# Patient Record
Sex: FEMALE | Race: WHITE | NOT HISPANIC OR LATINO | Employment: STUDENT | URBAN - METROPOLITAN AREA
[De-identification: names, ages, dates, MRNs, and addresses within clinical notes are randomized per-mention and may not be internally consistent; named-entity substitution may affect disease eponyms.]

---
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
## 2021-03-10 ENCOUNTER — OFFICE VISIT (OUTPATIENT)
Dept: INTERNAL MEDICINE CLINIC | Facility: CLINIC | Age: 21
End: 2021-03-10
Payer: COMMERCIAL

## 2021-03-10 VITALS
WEIGHT: 139.8 LBS | TEMPERATURE: 97.8 F | OXYGEN SATURATION: 99 % | BODY MASS INDEX: 24.77 KG/M2 | DIASTOLIC BLOOD PRESSURE: 86 MMHG | SYSTOLIC BLOOD PRESSURE: 122 MMHG | HEART RATE: 73 BPM | HEIGHT: 63 IN

## 2021-03-10 DIAGNOSIS — Z83.438 FAMILY HISTORY OF HYPERLIPIDEMIA: ICD-10-CM

## 2021-03-10 DIAGNOSIS — Z00.01 ENCOUNTER FOR GENERAL ADULT MEDICAL EXAMINATION WITH ABNORMAL FINDINGS: Primary | ICD-10-CM

## 2021-03-10 DIAGNOSIS — M54.6 ACUTE BILATERAL THORACIC BACK PAIN: ICD-10-CM

## 2021-03-10 DIAGNOSIS — N92.0 MENORRHAGIA WITH REGULAR CYCLE: ICD-10-CM

## 2021-03-10 PROBLEM — M54.50 ACUTE BILATERAL LOW BACK PAIN WITHOUT SCIATICA: Status: ACTIVE | Noted: 2021-03-10

## 2021-03-10 PROBLEM — J30.2 SEASONAL ALLERGIES: Status: ACTIVE | Noted: 2021-03-10

## 2021-03-10 PROCEDURE — 3725F SCREEN DEPRESSION PERFORMED: CPT | Performed by: INTERNAL MEDICINE

## 2021-03-10 PROCEDURE — 99385 PREV VISIT NEW AGE 18-39: CPT | Performed by: INTERNAL MEDICINE

## 2021-03-10 RX ORDER — LORATADINE 10 MG/1
10 TABLET ORAL DAILY
COMMUNITY

## 2021-03-10 NOTE — PROGRESS NOTES
[facility] INTERNAL MEDICINE [place]    NAME: Samuel Westfall  AGE: 24 y o  SEX: female  : 2000     DATE: 3/10/2021     Assessment and Plan:     Problem List Items Addressed This Visit        Other    Menorrhagia with regular cycle     She has heavy periods specially in the 1st and 2 days  She is taking naproxen with  Improvement on dysmenorrhea as well  Relevant Orders    CBC    TSH, 3rd generation with Free T4 reflex    Comprehensive metabolic panel    Acute bilateral thoracic back pain      Pain is likely muscleskeletal   Recommended naproxen as needed and back exercises  Patient would like to see orthopedic surgeon for the same and referral was made  Relevant Orders    C-reactive protein    Ambulatory referral to Orthopedic Surgery      Other Visit Diagnoses     Encounter for general adult medical examination with abnormal findings    -  Primary    Family history of hyperlipidemia        Relevant Orders    Lipid Panel with Direct LDL reflex          Immunizations and preventive care screenings were discussed with patient today  Appropriate education was printed on patient's after visit summary  Counseling:  Alcohol/drug use: discussed moderation in alcohol intake, the recommendations for healthy alcohol use, and avoidance of illicit drug use  Dental Health: discussed importance of regular tooth brushing, flossing, and dental visits  Injury prevention: discussed safety/seat belts, safety helmets, smoke detectors, carbon dioxide detectors, and smoking near bedding or upholstery  Sexual health: discussed sexually transmitted diseases, partner selection, use of condoms, avoidance of unintended pregnancy, and contraceptive alternatives  · Exercise: the importance of regular exercise/physical activity was discussed  Recommend exercise 3-5 times per week for at least 30 minutes  Return in 1 year (on 3/10/2022)  Chief Complaint:     Chief Complaint   Patient presents with    Establish Care     referral to see orthopedics      History of Present Illness:     Adult Annual Physical   Patient here for a comprehensive physical exam  The patient reports problems - back pain, moderate to severe localized in the lower back and neck that is getting worse for the past 2 weeks  Patient so far tried Tylenol and hot/ cold compresses with minimal relief  She did have a history of lower back pain in the past, but now the pain is more localized in the midthoracic area and neck  There is no  Radiating pain, no numbness, tingling or weakness on upper lower extremities  Patient denies any family history of autoimmune disease, she does have a family history of hyperlipidemia and hypertension only  Patient also has a history of dysmenorrhea and menorrhagia, she was seen by OBGYN recently, she was prescribed naproxen to be used as needed, with moderate relief of both  Patient would like to have a blood work done as a checkup, she states that has been many years since she had a blood work done  Patient refused flu vaccine  Diet and Physical Activity  · Diet/Nutrition: well balanced diet, limited junk food, consuming 3-5 servings of fruits/vegetables daily and adequate whole grain intake  · Exercise: walking and less than 30 minutes on average  Depression Screening  PHQ-9 Depression Screening    PHQ-9:   Frequency of the following problems over the past two weeks:      Little interest or pleasure in doing things: 0 - not at all  Feeling down, depressed, or hopeless: 0 - not at all  PHQ-2 Score: 0       General Health  · Sleep: sleeps well and gets more than 8 hours of sleep on average  · Hearing: normal - bilateral   · Vision: wears glasses  · Dental: regular dental visits  /GYN Health  · Last menstrual period: 02/28/2021  · Contraceptive method: not sexualy active    · History of STDs?: no      Review of Systems: Review of Systems   Constitutional: Negative for appetite change, fatigue and fever  HENT: Negative for congestion, ear discharge, ear pain, postnasal drip and rhinorrhea  Eyes: Negative for visual disturbance  Respiratory: Negative for cough, chest tightness, shortness of breath and wheezing  Cardiovascular: Negative for chest pain, palpitations and leg swelling  Gastrointestinal: Negative for abdominal pain, nausea and vomiting  Genitourinary:          Painful and heavy menstrual bleeding   Musculoskeletal: Positive for back pain (  Bilateral thoracic and occasionally lower back pain) and neck pain ( occasionally)  Negative for arthralgias and joint swelling  Skin: Negative for rash  Neurological: Negative for dizziness, weakness, numbness and headaches  Psychiatric/Behavioral: Negative for confusion  The patient is not nervous/anxious  Past Medical History:     Past Medical History:   Diagnosis Date    Allergic       Past Surgical History:     History reviewed  No pertinent surgical history     Social History:     E-Cigarette/Vaping    E-Cigarette Use Never User      E-Cigarette/Vaping Substances    Nicotine No     THC No     CBD No     Flavoring No     Other No     Unknown No      Social History     Socioeconomic History    Marital status: Single     Spouse name: None    Number of children: None    Years of education: None    Highest education level: None   Occupational History    None   Social Needs    Financial resource strain: None    Food insecurity     Worry: None     Inability: None    Transportation needs     Medical: None     Non-medical: None   Tobacco Use    Smoking status: Never Smoker    Smokeless tobacco: Never Used   Substance and Sexual Activity    Alcohol use: Not Currently    Drug use: Not Currently    Sexual activity: Yes     Partners: Male     Birth control/protection: Condom Male   Lifestyle    Physical activity     Days per week: None Minutes per session: None    Stress: None   Relationships    Social connections     Talks on phone: None     Gets together: None     Attends Christianity service: None     Active member of club or organization: None     Attends meetings of clubs or organizations: None     Relationship status: None    Intimate partner violence     Fear of current or ex partner: None     Emotionally abused: None     Physically abused: None     Forced sexual activity: None   Other Topics Concern    None   Social History Narrative    None      Family History:     Family History   Problem Relation Age of Onset    Hypertension Father     Hyperlipidemia Father     Hypertension Paternal Grandmother       Current Medications:     Current Outpatient Medications   Medication Sig Dispense Refill    loratadine (CLARITIN) 10 mg tablet Take 10 mg by mouth daily      naproxen sodium (ANAPROX) 550 mg tablet Take 1 tablet (550 mg total) by mouth 2 (two) times a day with meals for 30 doses As needed for cramps 30 tablet 3     No current facility-administered medications for this visit  Allergies:     No Known Allergies   Physical Exam:     /86 (BP Location: Left arm, Patient Position: Sitting, Cuff Size: Standard)   Pulse 73   Temp 97 8 °F (36 6 °C) (Tympanic)   Ht 5' 3" (1 6 m)   Wt 63 4 kg (139 lb 12 8 oz)   SpO2 99%   BMI 24 76 kg/m²     Physical Exam  Vitals signs and nursing note reviewed  Constitutional:       General: She is not in acute distress  Appearance: She is well-developed  HENT:      Head: Normocephalic and atraumatic  Right Ear: Tympanic membrane, ear canal and external ear normal       Left Ear: Tympanic membrane, ear canal and external ear normal       Nose: Nose normal  No congestion  Mouth/Throat:      Mouth: Mucous membranes are moist       Pharynx: Oropharynx is clear  No oropharyngeal exudate     Eyes:      Conjunctiva/sclera: Conjunctivae normal    Neck:      Musculoskeletal: Normal range of motion and neck supple  No muscular tenderness  Cardiovascular:      Rate and Rhythm: Normal rate and regular rhythm  Heart sounds: No murmur  Pulmonary:      Effort: Pulmonary effort is normal  No respiratory distress  Breath sounds: Normal breath sounds  Abdominal:      General: Bowel sounds are normal       Palpations: Abdomen is soft  Tenderness: There is no abdominal tenderness  Musculoskeletal: Normal range of motion  General: No swelling or tenderness  Comments:  No paraspinal muscle tenderness,  Straight leg raise negative   Skin:     General: Skin is warm and dry  Capillary Refill: Capillary refill takes less than 2 seconds  Findings: No rash  Neurological:      Mental Status: She is alert and oriented to person, place, and time  Sensory: No sensory deficit  Motor: No weakness  Gait: Gait normal    Psychiatric:         Mood and Affect: Mood normal          Thought Content:  Thought content normal          Judgment: Judgment normal           Amanda Dugan MD   [facility] INTERNAL MEDICINE [place]

## 2021-03-10 NOTE — ASSESSMENT & PLAN NOTE
Pain is likely muscleskeletal   Recommended naproxen as needed and back exercises  Patient would like to see orthopedic surgeon for the same and referral was made

## 2021-03-10 NOTE — PATIENT INSTRUCTIONS
Upper Back Exercises   AMBULATORY CARE:   Upper back exercises  help heal and strengthen your back muscles and prevent another injury  Ask your healthcare provider if you need to see a physical therapist for more advanced exercises  · Do the exercises on a mat or firm surface  (not on a bed) to support your spine  · Move slowly and smoothly  Avoid fast or jerky motions  · Breathe normally  Do not hold your breath  · Stop if you feel pain  It is normal to feel some discomfort at first  Regular exercise will help decrease your discomfort over time  Seek care immediately if:   · You have severe pain that prevents you from moving  Contact your healthcare provider if:   · Your pain becomes worse  · You have new pain  · You have questions or concerns about your condition, care, or exercise program     Perform upper back exercises safely:  Ask your healthcare provider which of the following exercises are best for you and how often to do them  · Head rolls:  Sit in a chair or stand  Bring your chin toward your chest and roll your head to the right  Your ear should be over your shoulder  Hold this position for 5 seconds  Roll your head back toward your chest and to the left  Your ear should be over your left shoulder  Hold this position for 5 seconds  Next, roll your head back slowly in a clockwise [place] and repeat 3 times  Do 3 sets of head rolls  · Scapular squeeze:  Sit or stand with your arms at your sides  Squeeze your shoulder blades together and hold for 3 seconds  Relax and repeat 3 times  · Pectoralis stretch:   a doorway  Lift your hands and place them on each side of the door frame or wall slightly higher than your head  Lean forward slowly until you feel a gentle stretch  Hold for 15 seconds  Repeat 3 times, or as directed  · Cat and camel exercise:  Place your hands and knees on the floor   Arch your back upward toward the ceiling and lower your head  Round out your spine as much as you can  Hold for 5 seconds  Lift your head upward and push your chest downward toward the floor  Hold for 5 seconds  Do 3 sets or as directed  · Bird dog:  Place your hands and knees on the floor  Keep your wrists directly below your shoulders and your knees directly below your hips  Pull your belly button in toward your spine  Do not flatten or arch your back  Tighten your abdominal muscles  Raise one arm straight out so that it is aligned with your head  Next, raise the leg opposite your arm  Hold this position for 15 seconds  Lower your arm and leg slowly and change sides  Do 5 sets  © Copyright [address] Information is for End User's use only and may not be sold, redistributed or otherwise used for commercial purposes  All illustrations and images included in CareNotes® are the copyrighted property of A D A M , Inc  or [facility] Elias Michaud   The above information is an  only  It is not intended as medical advice for individual conditions or treatments  Talk to your doctor, nurse or pharmacist before following any medical regimen to see if it is safe and effective for you  Wellness Visit for Adults   AMBULATORY CARE:   A wellness visit  is when you see your healthcare provider to get screened for health problems  Your healthcare provider will also give you advice on how to stay healthy  Write down your questions so you remember to ask them  Ask your healthcare provider how often you should have a wellness visit  What happens at a wellness visit:  Your healthcare provider will ask about your health, and your family history of health problems  This includes high blood pressure, heart disease, and cancer  He or she will ask if you have symptoms that concern you, if you smoke, and about your mood  You may also be asked about your intake of medicines, supplements, food, and alcohol   Any of the following may be done:  · Your weight  will be checked  Your height may also be checked so your body mass index (BMI) can be calculated  Your BMI shows if you are at a healthy weight  · Your blood pressure  and heart rate will be checked  Your temperature may also be checked  · Blood and urine tests  may be done  Blood tests may be done to check your cholesterol levels  Abnormal cholesterol levels increase your risk for heart disease and stroke  You may also need a blood or urine test to check for diabetes if you are at increased risk  Urine tests may be done to look for signs of an infection or kidney disease  · A physical exam  includes checking your heartbeat and lungs with a stethoscope  Your healthcare provider may also check your skin to look for sun damage  · Screening tests  may be recommended  A screening test is done to check for diseases that may not cause symptoms  The screening tests you may need depend on your age, gender, family history, and lifestyle habits  For example, colorectal screening may be recommended if you are 48years old or older  Screening tests you need if you are a woman:   · A Pap smear  is used to screen for cervical cancer  Pap smears are usually done every 3 to 5 years depending on your age  You may need them more often if you have had abnormal Pap smear test results in the past  Ask your healthcare provider how often you should have a Pap smear  · A mammogram  is an x-ray of your breasts to screen for breast cancer  Experts recommend mammograms every 2 years starting at age 48 years  You may need a mammogram at age 52 years or younger if you have an increased risk for breast cancer  Talk to your healthcare provider about when you should start having mammograms and how often you need them  Vaccines you may need:   · Get an influenza vaccine  every year  The influenza vaccine protects you from the flu  Several types of viruses cause the flu   The viruses change over time, so new vaccines are made each year     · Get a tetanus-diphtheria (Td) booster vaccine  every 10 years  This vaccine protects you against tetanus and diphtheria  Tetanus is a severe infection that may cause painful muscle spasms and lockjaw  Diphtheria is a severe bacterial infection that causes a thick covering in the back of your mouth and throat  · Get a human papillomavirus (HPV) vaccine  if you are female and aged 23 to 32 or male 23 to 24 and never received it  This vaccine protects you from HPV infection  HPV is the most common infection spread by sexual contact  HPV may also cause vaginal, penile, and anal cancers  · Get a pneumococcal vaccine  if you are aged 72 years or older  The pneumococcal vaccine is an injection given to protect you from pneumococcal disease  Pneumococcal disease is an infection caused by pneumococcal bacteria  The infection may cause pneumonia, meningitis, or an ear infection  · Get a shingles vaccine  if you are 60 or older, even if you have had shingles before  The shingles vaccine is an injection to protect you from the varicella-zoster virus  This is the same virus that causes chickenpox  Shingles is a painful rash that develops in people who had chickenpox or have been exposed to the virus  How to eat healthy:  My Plate is a model for planning healthy meals  It shows the types and amounts of foods that should go on your plate  Fruits and vegetables make up about half of your plate, and grains and protein make up the other half  A serving of dairy is included on the side of your plate  The amount of calories and serving sizes you need depends on your age, gender, weight, and height  Examples of healthy foods are listed below:  · Eat a variety of vegetables  such as dark green, red, and orange vegetables  You can also include canned vegetables low in sodium (salt) and frozen vegetables without added butter or sauces      · Eat a variety of fresh fruits , canned fruit in 100% juice, frozen fruit, and dried fruit  · Include whole grains  At least half of the grains you eat should be whole grains  Examples include whole-wheat bread, wheat pasta, brown rice, and whole-grain cereals such as oatmeal     · Eat a variety of protein foods such as seafood (fish and shellfish), lean meat, and poultry without skin (turkey and chicken)  Examples of lean meats include pork leg, shoulder, or tenderloin, and beef round, sirloin, tenderloin, and extra lean ground beef  Other protein foods include eggs and egg substitutes, beans, peas, soy products, nuts, and seeds  · Choose low-fat dairy products such as skim or 1% milk or low-fat yogurt, cheese, and cottage cheese  · Limit unhealthy fats  such as butter, hard margarine, and shortening  Exercise:  Exercise at least 30 minutes per day on most days of the week  Some examples of exercise include walking, biking, dancing, and swimming  You can also fit in more physical activity by taking the stairs instead of the elevator or parking farther away from stores  Include muscle strengthening activities 2 days each week  Regular exercise provides many health benefits  It helps you manage your weight, and decreases your risk for type 2 diabetes, heart disease, stroke, and high blood pressure  Exercise can also help improve your mood  Ask your healthcare provider about the best exercise plan for you  General health and safety guidelines:   · Do not smoke  Nicotine and other chemicals in cigarettes and cigars can cause lung damage  Ask your healthcare provider for information if you currently smoke and need help to quit  E-cigarettes or smokeless tobacco still contain nicotine  Talk to your healthcare provider before you use these products  · Limit alcohol  A drink of alcohol is 12 ounces of beer, 5 ounces of wine, or 1½ ounces of liquor  · Lose weight, if needed  Being overweight increases your risk of certain health conditions   These include heart disease, high blood pressure, type 2 diabetes, and certain types of cancer  · Protect your skin  Do not sunbathe or use tanning beds  Use sunscreen with a SPF 15 or higher  Apply sunscreen at least 15 minutes before you go outside  Reapply sunscreen every 2 hours  Wear protective clothing, hats, and sunglasses when you are outside  · Drive safely  Always wear your seatbelt  Make sure everyone in your car wears a seatbelt  A seatbelt can save your life if you are in an accident  Do not use your cell phone when you are driving  This could distract you and cause an accident  Pull over if you need to make a call or send a text message  · Practice safe sex  Use latex condoms if are sexually active and have more than one partner  Your healthcare provider may recommend screening tests for sexually transmitted infections (STIs)  · Wear helmets, lifejackets, and protective gear  Always wear a helmet when you ride a bike or motorcycle, go skiing, or play sports that could cause a head injury  Wear protective equipment when you play sports  Wear a lifejacket when you are on a boat or doing water sports  © Copyright [address] Information is for End User's use only and may not be sold, redistributed or otherwise used for commercial purposes  All illustrations and images included in CareNotes® are the copyrighted property of [employer] , Inc  or [employer]  The above information is an  only  It is not intended as medical advice for individual conditions or treatments  Talk to your doctor, nurse or pharmacist before following any medical regimen to see if it is safe and effective for you

## 2021-03-10 NOTE — ASSESSMENT & PLAN NOTE
She has heavy periods specially in the 1st and 2 days  She is taking naproxen with  Improvement on dysmenorrhea as well

## 2021-03-11 ENCOUNTER — TRANSCRIBE ORDERS (OUTPATIENT)
Dept: LAB | Facility: CLINIC | Age: 21
End: 2021-03-11

## 2021-03-11 ENCOUNTER — APPOINTMENT (OUTPATIENT)
Dept: LAB | Facility: CLINIC | Age: 21
End: 2021-03-11
Payer: COMMERCIAL

## 2021-03-11 DIAGNOSIS — N92.0 MENORRHAGIA WITH REGULAR CYCLE: ICD-10-CM

## 2021-03-11 DIAGNOSIS — Z83.438 FAMILY HISTORY OF HYPERLIPIDEMIA: ICD-10-CM

## 2021-03-11 DIAGNOSIS — M54.6 ACUTE BILATERAL THORACIC BACK PAIN: ICD-10-CM

## 2021-03-11 LAB
ALBUMIN SERPL BCP-MCNC: 4.1 G/DL (ref 3.5–5)
ALP SERPL-CCNC: 84 U/L (ref 46–384)
ALT SERPL W P-5'-P-CCNC: 22 U/L (ref 12–78)
ANION GAP SERPL CALCULATED.3IONS-SCNC: 10 MMOL/L (ref 4–13)
AST SERPL W P-5'-P-CCNC: 14 U/L (ref 5–45)
BILIRUB SERPL-MCNC: 0.93 MG/DL (ref 0.2–1)
BUN SERPL-MCNC: 16 MG/DL (ref 5–25)
CALCIUM SERPL-MCNC: 9.4 MG/DL (ref 8.3–10.1)
CHLORIDE SERPL-SCNC: 106 MMOL/L (ref 100–108)
CHOLEST SERPL-MCNC: 146 MG/DL (ref 50–200)
CO2 SERPL-SCNC: 27 MMOL/L (ref 21–32)
CREAT SERPL-MCNC: 1.17 MG/DL (ref 0.6–1.3)
CRP SERPL QL: <3 MG/L
ERYTHROCYTE [DISTWIDTH] IN BLOOD BY AUTOMATED COUNT: 12.2 % (ref 11.6–15.1)
GFR SERPL CREATININE-BSD FRML MDRD: 68 ML/MIN/1.73SQ M
GLUCOSE P FAST SERPL-MCNC: 84 MG/DL (ref 65–99)
HCT VFR BLD AUTO: 43.2 % (ref 34.8–46.1)
HDLC SERPL-MCNC: 75 MG/DL
HGB BLD-MCNC: 14.7 G/DL (ref 11.5–15.4)
LDLC SERPL CALC-MCNC: 62 MG/DL (ref 0–100)
MCH RBC QN AUTO: 33.9 PG (ref 26.8–34.3)
MCHC RBC AUTO-ENTMCNC: 34 G/DL (ref 31.4–37.4)
MCV RBC AUTO: 100 FL (ref 82–98)
PLATELET # BLD AUTO: 228 THOUSANDS/UL (ref 149–390)
PMV BLD AUTO: 9.8 FL (ref 8.9–12.7)
POTASSIUM SERPL-SCNC: 4.1 MMOL/L (ref 3.5–5.3)
PROT SERPL-MCNC: 7.5 G/DL (ref 6.4–8.2)
RBC # BLD AUTO: 4.34 MILLION/UL (ref 3.81–5.12)
SODIUM SERPL-SCNC: 143 MMOL/L (ref 136–145)
TRIGL SERPL-MCNC: 46 MG/DL
TSH SERPL DL<=0.05 MIU/L-ACNC: 2.17 UIU/ML (ref 0.46–3.98)
WBC # BLD AUTO: 5.31 THOUSAND/UL (ref 4.31–10.16)

## 2021-03-11 PROCEDURE — 80053 COMPREHEN METABOLIC PANEL: CPT

## 2021-03-11 PROCEDURE — 85027 COMPLETE CBC AUTOMATED: CPT

## 2021-03-11 PROCEDURE — 84443 ASSAY THYROID STIM HORMONE: CPT

## 2021-03-11 PROCEDURE — 86140 C-REACTIVE PROTEIN: CPT

## 2021-03-11 PROCEDURE — 36415 COLL VENOUS BLD VENIPUNCTURE: CPT

## 2021-03-11 PROCEDURE — 80061 LIPID PANEL: CPT

## 2021-03-25 ENCOUNTER — APPOINTMENT (OUTPATIENT)
Dept: RADIOLOGY | Facility: AMBULARY SURGERY CENTER | Age: 21
End: 2021-03-25
Attending: ORTHOPAEDIC SURGERY
Payer: COMMERCIAL

## 2021-03-25 ENCOUNTER — CONSULT (OUTPATIENT)
Dept: OBGYN CLINIC | Facility: CLINIC | Age: 21
End: 2021-03-25
Payer: COMMERCIAL

## 2021-03-25 VITALS
BODY MASS INDEX: 23.92 KG/M2 | HEART RATE: 75 BPM | WEIGHT: 135 LBS | HEIGHT: 63 IN | SYSTOLIC BLOOD PRESSURE: 124 MMHG | DIASTOLIC BLOOD PRESSURE: 72 MMHG

## 2021-03-25 DIAGNOSIS — M54.6 ACUTE BILATERAL THORACIC BACK PAIN: ICD-10-CM

## 2021-03-25 DIAGNOSIS — M41.9 SCOLIOSIS OF THORACOLUMBAR SPINE, UNSPECIFIED SCOLIOSIS TYPE: Primary | ICD-10-CM

## 2021-03-25 PROCEDURE — 1036F TOBACCO NON-USER: CPT | Performed by: ORTHOPAEDIC SURGERY

## 2021-03-25 PROCEDURE — 72083 X-RAY EXAM ENTIRE SPI 4/5 VW: CPT

## 2021-03-25 PROCEDURE — 3008F BODY MASS INDEX DOCD: CPT | Performed by: ORTHOPAEDIC SURGERY

## 2021-03-25 PROCEDURE — 99243 OFF/OP CNSLTJ NEW/EST LOW 30: CPT | Performed by: ORTHOPAEDIC SURGERY

## 2021-03-25 NOTE — PROGRESS NOTES
Assessment:  1  Scoliosis of thoracolumbar spine, unspecified scoliosis type     2  Acute bilateral thoracic back pain  Ambulatory referral to Orthopedic Surgery    Ambulatory referral to Physical Therapy    CANCELED: XR spine thoracolumbar 2 vw     Patient Active Problem List   Diagnosis    Menorrhagia with regular cycle    Acute bilateral thoracic back pain    Seasonal allergies           Plan        78-year-old female with mild scoliosis of the thoracolumbar region  Patient and I reviewed her x-rays which does show mild curvature of her spine  We discussed due to these curvatures this could set her up to have increased irritation with increased activity  We discussed non operative treatment of physical therapy for stretching, strengthening, and to build endurance  She may continue activities as tolerated  I would not recommend any surgical intervention at this time  She may follow up with me as-needed basis  Subjective:     Patient ID:    Chief Jessa Yang 24 y o  female      HPI    Patient comes in today with regards to upper back pain  I am seeing her in consultation at the request of Deborah Mata MD  The patient reports that the pain is in the upper thoracic and scapular region and has been going on for about a month  The pain is rated at 10 at its worst   Patient states she feels a constant tenseness about her back  The pain is described as sharp  It is worsened with repetitive lifting  She does work at [employer] and does lift a lot for her job  Patient reports she does get relief with Naprosyn  She denies any injury to her back  She does not believe she has a history of scoliosis  She does note a history of tenseness in her back prior to this episode of pain       The following portions of the patient's history were reviewed and updated as appropriate: allergies, current medications, past family history, past social history, past surgical history and problem list         Social History     Socioeconomic History    Marital status: Single     Spouse name: Not on file    Number of children: Not on file    Years of education: Not on file    Highest education level: Not on file   Occupational History    Not on file   Social Needs    Financial resource strain: Not on file    Food insecurity     Worry: Not on file     Inability: Not on file    Transportation needs     Medical: Not on file     Non-medical: Not on file   Tobacco Use    Smoking status: Never Smoker    Smokeless tobacco: Never Used   Substance and Sexual Activity    Alcohol use: Not Currently    Drug use: Not Currently    Sexual activity: Yes     Partners: Male     Birth control/protection: Condom Male   Lifestyle    Physical activity     Days per week: Not on file     Minutes per session: Not on file    Stress: Not on file   Relationships    Social connections     Talks on phone: Not on file     Gets together: Not on file     Attends [employer] service: Not on file     Active member of club or organization: Not on file     Attends meetings of clubs or organizations: Not on file     Relationship status: Not on file    Intimate partner violence     Fear of current or ex partner: Not on file     Emotionally abused: Not on file     Physically abused: Not on file     Forced sexual activity: Not on file   Other Topics Concern    Not on file   Social History Narrative    Not on file     Past Medical History:   Diagnosis Date    Allergic      History reviewed  No pertinent surgical history  No Known Allergies  Current Outpatient Medications on File Prior to Visit   Medication Sig Dispense Refill    loratadine (CLARITIN) 10 mg tablet Take 10 mg by mouth daily      naproxen sodium (ANAPROX) 550 mg tablet Take 1 tablet (550 mg total) by mouth 2 (two) times a day with meals for 30 doses As needed for cramps 30 tablet 3     No current facility-administered medications on file prior to visit  Objective:    Review of Systems   Constitutional: Negative for chills, fever and unexpected weight change  HENT: Negative for hearing loss, nosebleeds and sore throat  Eyes: Negative for pain, redness and visual disturbance  Respiratory: Negative for cough, shortness of breath and wheezing  Cardiovascular: Negative for chest pain, palpitations and leg swelling  Gastrointestinal: Negative for abdominal pain, nausea and vomiting  Endocrine: Negative for polydipsia and polyuria  Genitourinary: Negative for dyspareunia and hematuria  Musculoskeletal: Positive for back pain and myalgias  Negative for arthralgias  Skin: Negative for rash and wound  Neurological: Negative for dizziness, numbness and headaches  Psychiatric/Behavioral: Negative for decreased concentration and suicidal ideas  The patient is not nervous/anxious  Back Exam     Tenderness   The patient is experiencing tenderness in the thoracic ( bilateral rhomboids, left upper trapezius,  Bilateral levator scapulae   Bilateral paraspinal musculature T5 through T7,  inferior scapular  margin )  Range of Motion   The patient has normal back ROM  Extension: normal   Flexion: normal   Lateral bend right: normal   Lateral bend left: normal   Rotation right: normal   Rotation left: normal     Other   Sensation: normal  Gait: normal   Erythema: no back redness  Scars: absent    Comments:  Left ribs are more pronounced   Right arm is closer to body  Right scapula is more pronounced  Left scapula is higher by 1 cm  Right scapular is more protracted   Right shoulder is lower than left   Right side crease is more pronounced   Pelvis is symmetric      levator scapulae musculature is tight and spastic bilaterally            Physical Exam  Vitals signs reviewed  Constitutional:       Appearance: She is well-developed  HENT:      Head: Normocephalic and atraumatic     Eyes:      Conjunctiva/sclera: Conjunctivae normal  Neck:      Musculoskeletal: Neck supple  Cardiovascular:      Rate and Rhythm: Normal rate  Pulses: Normal pulses  Pulmonary:      Effort: Pulmonary effort is normal    Skin:     General: Skin is warm and dry  Neurological:      Mental Status: She is alert and oriented to person, place, and time  Psychiatric:         Behavior: Behavior normal          Procedures  No Procedures performed today    I have personally reviewed pertinent films in PACS and my interpretation is Mild thoracolumbar scoliotic curvature   Scribe Attestation    I,:  Letitia Bee MD am acting as a scribe while in the presence of the attending physician :       I,:  Gloria Klein DO personally performed the services described in this documentation    as scribed in my presence :             Portions of the record may have been created with voice recognition software   Occasional wrong word or "sound a like" substitutions may have occurred due to the inherent limitations of voice recognition software   Read the chart carefully and recognize, using context, where substitutions have occurred

## 2021-04-12 ENCOUNTER — EVALUATION (OUTPATIENT)
Dept: PHYSICAL THERAPY | Facility: CLINIC | Age: 21
End: 2021-04-12
Payer: COMMERCIAL

## 2021-04-12 DIAGNOSIS — M54.6 ACUTE BILATERAL THORACIC BACK PAIN: ICD-10-CM

## 2021-04-12 PROCEDURE — 97110 THERAPEUTIC EXERCISES: CPT | Performed by: PHYSICAL THERAPIST

## 2021-04-12 PROCEDURE — 97161 PT EVAL LOW COMPLEX 20 MIN: CPT | Performed by: PHYSICAL THERAPIST

## 2021-04-12 NOTE — PROGRESS NOTES
PT Evaluation     Today's date: 2021  Patient name: Johana Gomes  : 2000  MRN: [id number]  Referring provider: Dalyin Sampson DO  Dx:   Encounter Diagnosis     ICD-10-CM    1  Acute bilateral thoracic back pain  M54 6 Ambulatory referral to Physical Therapy                  Assessment  Assessment details: Johana Gomes is a 24 y o  female who presents with pain, decreased strength, and decreased ROM  Due to these impairments, patient has difficulty performing a/iadls, recreational activities and work-related activities  Patient's clinical presentation is consistent with their referring diagnosis of acute bilateral thoracic back pain  Patient would benefit from skilled physical therapy to address their aforementioned impairments, improve their level of function and to improve their overall quality of life  Patient states she will only be able to attend PT one time per week secondary to scheduling conflicts  Impairments: abnormal or restricted ROM, impaired physical strength and pain with function  Understanding of Dx/Px/POC: good   Prognosis: good    Goals  Short term goals - to be achieved in 4 weeks:    Decrease pain 20-50%  Increase strength by 1/2 grade  Improve T/S ROM by 25%  Long term goals - to be achieved by discharge:    Lifting is improved to maximal level of function  Performance in related work activities is improved to maximal level of function  IADL performance in related activities is improved to maximal level of function  Sitting tolerance is improved to maximal level of function          Plan  Planned therapy interventions: manual therapy, neuromuscular re-education, patient education, strengthening, stretching, therapeutic activities, therapeutic exercise and home exercise program  Frequency: 1x week  Duration in visits: 6  Duration in weeks: 6  Plan of Care beginning date: 2021  Plan of Care expiration date: 2021  Treatment plan discussed with: patient        Subjective Evaluation    History of Present Illness  Mechanism of injury: Patient refers to PT with c/o pain in her T/S which has been present for approximately three years of insidious onset  Patient states pain has increased in the past five months of insidious onset  Patient received X-rays of her spine on 3/25/21 which revealed mild thoracolumbar scoliosis; no acute fracture or dislocation  Patient denies numbness and tingling in bilateral UE's and bilateral LE's  Patient states increased pain with lifting activities  Patient states increased pain after sitting for an hour or greater; states pain is worse in the morning  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  Pain  Current pain ratin  At best pain ratin  At worst pain rating: 10  Quality: sharp  Relieving factors: medications  Aggravating factors: lifting and sitting (Sitting for greater than 1 hour)  Progression: no change    Patient Goals  Patient goals for therapy: decreased pain          Objective     Static Posture     Shoulders  Rounded      Neurological Testing     Sensation   Cervical/Thoracic   Left   Intact: light touch    Right   Intact: light touch    Lumbar   Left   Intact: light touch    Right   Intact: light touch    Passive Range of Motion   Cervical/Thoracic Spine     Thoracic     Flexion (degrees):  WFL  Extension (degrees):  Restriction level: minimal  Left lateral flexion (degrees):  Restriction level: minimal  Right lateral flexion (degrees):  Restriction level: minimal  Left rotation (degress):  Restriction level: minimal  Right rotation (degrees):  Restriction level: minimal    Strength/Myotome Testing     Left Shoulder     Planes of Motion   Flexion: 4+ (pain)   Abduction: 5     Right Shoulder     Planes of Motion   Flexion: 4+ (pain)   Abduction: 5     Left Elbow   Flexion: 5  Extension: 5    Right Elbow   Flexion: 5  Extension: 5    Left Wrist/Hand   Wrist extension: 5  Wrist flexion: 5    Right Wrist/Hand   Wrist extension: 5  Wrist flexion: 5    Left Hip   Planes of Motion   Flexion: 5    Right Hip   Planes of Motion   Flexion: 5    Left Knee   Flexion: 5  Extension: 4+ (pain)    Right Knee   Flexion: 5  Extension: 5    Left Ankle/Foot   Dorsiflexion: 5  Plantar flexion: 5    Right Ankle/Foot   Dorsiflexion: 5  Plantar flexion: 5    General Comments:      Cervical/Thoracic Comments  Decreased mobility t/o mid T/S   Slight decrease in pain level after grade V mobilization  Repeated T/S extension in sitting with half roll - No change               Precautions: None      Manuals 4/12            Gr V mid T/S mobilization KK                                                   Neuro Re-Ed             Iso scap ret  HEP                                                                                          Ther Ex             UBE-backward             TB rows HEP            TB leanne  shld ER w/ scap retract             TB lat pull downs             Doorway stretch             Sitting T/S ext   With towel roll HEP                                      Ther Activity                                       Gait Training                                       Modalities                                           HEP access code: [email or web address]

## 2021-04-20 ENCOUNTER — OFFICE VISIT (OUTPATIENT)
Dept: PHYSICAL THERAPY | Facility: CLINIC | Age: 21
End: 2021-04-20
Payer: COMMERCIAL

## 2021-04-20 DIAGNOSIS — M54.6 ACUTE BILATERAL THORACIC BACK PAIN: Primary | ICD-10-CM

## 2021-04-20 PROCEDURE — 97110 THERAPEUTIC EXERCISES: CPT

## 2021-04-20 PROCEDURE — 97112 NEUROMUSCULAR REEDUCATION: CPT

## 2021-04-20 NOTE — PROGRESS NOTES
Daily Note     Today's date: 2021  Patient name: Mary Chavez  : 2000  MRN: [id number]  Referring provider: Toro Najera DO  Dx:   Encounter Diagnosis     ICD-10-CM    1  Acute bilateral thoracic back pain  M54 6        Start Time: 900  Stop Time: 925  Total time in clinic (min): 25 minutes    Subjective: Pt reports increased thoracic pain since IE  Worse with sitting and lifting duties at work  Objective: See treatment diary below  Forward head posture   Education provided about changing position every hour when seated, posture awareness, and lumbar roll  Assessment: Tolerated treatment well  C/o tightness at end of session but no increase in symptoms  Posture correction/lumbar roll did not have effect on reduction of symptoms this visit  Patient would benefit from continued PT      Plan: Continue per plan of care  Assess response to first treatment and HEP compliance  Consider CS retraction if appropriate  Precautions: None      Manuals  04/20           Gr V mid T/S mobilization KK NP                                                  Neuro Re-Ed             Iso scap ret  HEP 10x10"           Posture   5'                                                                             Ther Ex             UBE-backward  5'            TB rows HEP 2x10   RTB           TB leanne  shld ER w/ scap retract  2x10 RTB            TB lat pull downs  2x10 RTB           Doorway stretch  :           Sitting T/S ext   With towel roll HEP 2x10                                      Ther Activity                                       Gait Training                                       Modalities

## 2021-04-22 PROBLEM — M41.20 IDIOPATHIC SCOLIOSIS: Status: ACTIVE | Noted: 2021-04-22

## 2021-04-23 ENCOUNTER — OFFICE VISIT (OUTPATIENT)
Dept: INTERNAL MEDICINE CLINIC | Facility: CLINIC | Age: 21
End: 2021-04-23
Payer: COMMERCIAL

## 2021-04-23 VITALS
HEIGHT: 63 IN | TEMPERATURE: 97.7 F | WEIGHT: 139.8 LBS | OXYGEN SATURATION: 100 % | BODY MASS INDEX: 24.77 KG/M2 | DIASTOLIC BLOOD PRESSURE: 80 MMHG | SYSTOLIC BLOOD PRESSURE: 120 MMHG | HEART RATE: 67 BPM

## 2021-04-23 DIAGNOSIS — M54.6 ACUTE BILATERAL THORACIC BACK PAIN: Primary | ICD-10-CM

## 2021-04-23 DIAGNOSIS — M41.25 OTHER IDIOPATHIC SCOLIOSIS, THORACOLUMBAR REGION: ICD-10-CM

## 2021-04-23 PROCEDURE — 99213 OFFICE O/P EST LOW 20 MIN: CPT | Performed by: INTERNAL MEDICINE

## 2021-04-23 NOTE — PROGRESS NOTES
Assessment/Plan:    Acute bilateral thoracic back pain  Evaluated by Orthopedic surgery, likely related to scoliosis  Recommended physical therapy, which she states she is doing once a week  Oriented to take naproxen prior to physical therapy so she can tolerated well  Also await at least half of the recommended sessions would be about 12-13 sessions to see the overall improvement on her symptoms  If not have recommended to return to orthopedic surgeon for further evaluation  At this point I do not see a role for MRI since she has no neurological involvement, and still under conservative treatment  Idiopathic scoliosis  Mild scoliosis seen on x-ray of spine, recommended PT per othorpedic evaluation  Please see thoracic pain assessment and plan  Diagnoses and all orders for this visit:    Acute bilateral thoracic back pain    Other idiopathic scoliosis, thoracolumbar region          Subjective:      Patient ID: Alycia Jones is a 24 y o  female  Who has a past medical history of seasonal allergy and scoliosis presents in the office   With concerns about her back pain  Patient was evaluated by orthopedic surgeon because of thoracic back pain, she was found to have scoliosis on x-ray and physical examination  She was recommended conservative management with physical therapy, stretching and strengthening exercise  Patient has been doing physical therapy and states that every time she does physical therapy she has worsening pain in her back  She also has some lower back pain with radiating pain to the anterior aspect of the left eye  She denies any weakness, numbness or tingling  No saddle anesthesia  The patient now is mostly on thoracic area, lower back pain is intermittent, and not present during this office visit now  Patient was wondering if she would benefit from I MRI for further evaluation of her pain    She states that she is taking only Tylenol as needed for pain, she takes naproxen but only around her periods because of dysmenorrhagia  The following portions of the patient's history were reviewed and updated as appropriate: allergies, current medications, past family history, past medical history, past social history, past surgical history and problem list     Review of Systems   Constitutional: Negative for appetite change and fatigue  Respiratory: Negative for cough, chest tightness and shortness of breath  Cardiovascular: Negative for chest pain, palpitations and leg swelling  Gastrointestinal: Negative for abdominal pain, nausea and vomiting  Genitourinary: Negative for difficulty urinating and frequency  Musculoskeletal: Positive for back pain  Negative for arthralgias, gait problem and joint swelling  Skin: Negative for rash  Neurological: Negative for weakness, numbness and headaches  Objective:      /80 (BP Location: Left arm, Patient Position: Sitting, Cuff Size: Standard)   Pulse 67   Temp 97 7 °F (36 5 °C) (Tympanic)   Ht 5' 3" (1 6 m)   Wt 63 4 kg (139 lb 12 8 oz)   SpO2 100%   BMI 24 76 kg/m²          Physical Exam  Vitals signs and nursing note reviewed  Constitutional:       General: She is not in acute distress  Appearance: She is well-developed  HENT:      Head: Normocephalic and atraumatic  Eyes:      Conjunctiva/sclera: Conjunctivae normal       Pupils: Pupils are equal, round, and reactive to light  Neck:      Musculoskeletal: Normal range of motion and neck supple  Thyroid: No thyromegaly  Cardiovascular:      Rate and Rhythm: Normal rate and regular rhythm  Heart sounds: Normal heart sounds  Pulmonary:      Effort: No respiratory distress  Breath sounds: Normal breath sounds  No wheezing  Musculoskeletal: Normal range of motion  General: No swelling  Comments: Left shoulder slight higher than right at upstanding position   Skin:     General: Skin is warm and dry        Capillary Refill: Capillary refill takes less than 2 seconds  Neurological:      General: No focal deficit present  Mental Status: She is alert and oriented to person, place, and time  Cranial Nerves: No cranial nerve deficit  Sensory: No sensory deficit  Motor: No weakness or abnormal muscle tone  Gait: Gait normal    Psychiatric:         Thought Content:  Thought content normal          Judgment: Judgment normal

## 2021-04-23 NOTE — PATIENT INSTRUCTIONS
Upper Back Exercises   AMBULATORY CARE:   Upper back exercises  help heal and strengthen your back muscles and prevent another injury  Ask your healthcare provider if you need to see a physical therapist for more advanced exercises  · Do the exercises on a mat or firm surface  (not on a bed) to support your spine  · Move slowly and smoothly  Avoid fast or jerky motions  · Breathe normally  Do not hold your breath  · Stop if you feel pain  It is normal to feel some discomfort at first  Regular exercise will help decrease your discomfort over time  Seek care immediately if:   · You have severe pain that prevents you from moving  Contact your healthcare provider if:   · Your pain becomes worse  · You have new pain  · You have questions or concerns about your condition, care, or exercise program     Perform upper back exercises safely:  Ask your healthcare provider which of the following exercises are best for you and how often to do them  · Head rolls:  Sit in a chair or stand  Bring your chin toward your chest and roll your head to the right  Your ear should be over your shoulder  Hold this position for 5 seconds  Roll your head back toward your chest and to the left  Your ear should be over your left shoulder  Hold this position for 5 seconds  Next, roll your head back slowly in a clockwise [place] and repeat 3 times  Do 3 sets of head rolls  · Scapular squeeze:  Sit or stand with your arms at your sides  Squeeze your shoulder blades together and hold for 3 seconds  Relax and repeat 3 times  · Pectoralis stretch:   a doorway  Lift your hands and place them on each side of the door frame or wall slightly higher than your head  Lean forward slowly until you feel a gentle stretch  Hold for 15 seconds  Repeat 3 times, or as directed  · Cat and camel exercise:  Place your hands and knees on the floor   Arch your back upward toward the ceiling and lower your head  Round out your spine as much as you can  Hold for 5 seconds  Lift your head upward and push your chest downward toward the floor  Hold for 5 seconds  Do 3 sets or as directed  · Bird dog:  Place your hands and knees on the floor  Keep your wrists directly below your shoulders and your knees directly below your hips  Pull your belly button in toward your spine  Do not flatten or arch your back  Tighten your abdominal muscles  Raise one arm straight out so that it is aligned with your head  Next, raise the leg opposite your arm  Hold this position for 15 seconds  Lower your arm and leg slowly and change sides  Do 5 sets  © Copyright [address] Information is for End User's use only and may not be sold, redistributed or otherwise used for commercial purposes  All illustrations and images included in CareNotes® are the copyrighted property of [employer]  or [facility] Elias Michaud   The above information is an  only  It is not intended as medical advice for individual conditions or treatments  Talk to your doctor, nurse or pharmacist before following any medical regimen to see if it is safe and effective for you

## 2021-04-23 NOTE — ASSESSMENT & PLAN NOTE
Mild scoliosis seen on x-ray of spine, recommended PT per othorpedic evaluation  Please see thoracic pain assessment and plan

## 2021-04-23 NOTE — ASSESSMENT & PLAN NOTE
Evaluated by Orthopedic surgery, likely related to scoliosis  Recommended physical therapy, which she states she is doing once a week  Oriented to take naproxen prior to physical therapy so she can tolerated well  Also await at least half of the recommended sessions would be about 12-13 sessions to see the overall improvement on her symptoms  If not have recommended to return to orthopedic surgeon for further evaluation  At this point I do not see a role for MRI since she has no neurological involvement, and still under conservative treatment

## 2021-04-27 ENCOUNTER — APPOINTMENT (OUTPATIENT)
Dept: PHYSICAL THERAPY | Facility: CLINIC | Age: 21
End: 2021-04-27
Payer: COMMERCIAL

## 2021-04-28 ENCOUNTER — OFFICE VISIT (OUTPATIENT)
Dept: OBGYN CLINIC | Facility: CLINIC | Age: 21
End: 2021-04-28
Payer: COMMERCIAL

## 2021-04-28 ENCOUNTER — APPOINTMENT (OUTPATIENT)
Dept: RADIOLOGY | Facility: AMBULARY SURGERY CENTER | Age: 21
End: 2021-04-28
Attending: ORTHOPAEDIC SURGERY
Payer: COMMERCIAL

## 2021-04-28 VITALS — WEIGHT: 139 LBS | HEIGHT: 63 IN | BODY MASS INDEX: 24.63 KG/M2

## 2021-04-28 DIAGNOSIS — R20.2 ARM PARESTHESIA, RIGHT: ICD-10-CM

## 2021-04-28 DIAGNOSIS — M41.25 OTHER IDIOPATHIC SCOLIOSIS, THORACOLUMBAR REGION: Primary | ICD-10-CM

## 2021-04-28 DIAGNOSIS — M54.12 RADICULITIS OF RIGHT CERVICAL REGION: ICD-10-CM

## 2021-04-28 DIAGNOSIS — M54.12 CERVICAL RADICULOPATHY: ICD-10-CM

## 2021-04-28 PROCEDURE — 3008F BODY MASS INDEX DOCD: CPT | Performed by: ORTHOPAEDIC SURGERY

## 2021-04-28 PROCEDURE — 99214 OFFICE O/P EST MOD 30 MIN: CPT | Performed by: ORTHOPAEDIC SURGERY

## 2021-04-28 PROCEDURE — 72052 X-RAY EXAM NECK SPINE 6/>VWS: CPT

## 2021-04-28 PROCEDURE — 1036F TOBACCO NON-USER: CPT | Performed by: ORTHOPAEDIC SURGERY

## 2021-04-28 RX ORDER — METHYLPREDNISOLONE 4 MG/1
TABLET ORAL
Qty: 1 EACH | Refills: 0 | Status: SHIPPED | OUTPATIENT
Start: 2021-04-28

## 2021-04-28 RX ORDER — METAXALONE 800 MG/1
800 TABLET ORAL 3 TIMES DAILY
Qty: 30 TABLET | Refills: 0 | Status: SHIPPED | OUTPATIENT
Start: 2021-04-28 | End: 2022-07-21 | Stop reason: ALTCHOICE

## 2021-04-28 NOTE — PROGRESS NOTES
Assessment/Plan:  1  Other idiopathic scoliosis, thoracolumbar region  methylPREDNISolone 4 MG tablet therapy pack    Ambulatory referral to Pain Management    metaxalone (SKELAXIN) 800 mg tablet   2  Radiculitis of right cervical region  methylPREDNISolone 4 MG tablet therapy pack    Ambulatory referral to Pain Management    metaxalone (SKELAXIN) 800 mg tablet    CANCELED: XR spine cervical complete 4 or 5 vw non injury     Patient Active Problem List   Diagnosis    Menorrhagia with regular cycle    Acute bilateral thoracic back pain    Seasonal allergies    Idiopathic scoliosis    Radiculitis of right cervical region       Discussion/Summary:    24 y o  female  With thoracic back pain and mild idiopathic thoracolumbar scoliosis  She reports radicular pain into the right arm pain that is worsened by palpation  Medial to the scapula on the right side which is not anatomic in nerve distribution  Spurling's test is negative as well suggesting less likelihood of a cervical radiculitis  Without any other medical history or history of trauma, her symptoms are less likely to be musculoskeletal in nature  We will refer her to pain management   For further evaluation of the spine and any  Contribution to her symptoms from her spine  Differential diagnosis may have to be brought in to include neurologic etiologies or possibly thoracic outlet syndrome in the future  At this time she does not  Have any significant objective findings on right shoulder exam but she may follow-up of right shoulder pain worsens with us  We will also place a referral to Neurology if her spine and pain at evaluation does not yield any  Explanation for her pain and radicular symptoms and paresthesias in the right upper extremity   we will do a trial of a oral steroid as well as a muscle relaxant at this time as well     The assessment and plan were formulated by Dr Rosemary Durham and I assisted in carrying it out            Subjective:   Patient ID: Renea Noel is a 24 y o  female   HPI    Patient presents to the office for follow up of  Thoracic back pain  Since the last visit, the patient reports  After physical therapy she reports pain radiating into the right arm  She denies any injury or trauma to the neck or right upper extremity  She notes occasional tingling in the dorsum of the hand as well  The symptoms are mild to moderate intermittent  She notes that the radicular symptoms increase with pressure over the musculature medial to the scapula  She denies any neck pain or thoracic back pain  Denies any fevers chills      The following portions of the patient's history were reviewed and updated as appropriate: allergies, current medications, past family history, past social history, past surgical history and problem list     Social History     Socioeconomic History    Marital status: Single     Spouse name: Not on file    Number of children: Not on file    Years of education: Not on file    Highest education level: Not on file   Occupational History    Not on file   Social Needs    Financial resource strain: Not on file    Food insecurity     Worry: Not on file     Inability: Not on file    Transportation needs     Medical: Not on file     Non-medical: Not on file   Tobacco Use    Smoking status: Never Smoker    Smokeless tobacco: Never Used   Substance and Sexual Activity    Alcohol use: Not Currently    Drug use: Not Currently    Sexual activity: Yes     Partners: Male     Birth control/protection: Condom Male   Lifestyle    Physical activity     Days per week: Not on file     Minutes per session: Not on file    Stress: Not on file   Relationships    Social connections     Talks on phone: Not on file     Gets together: Not on file     Attends [employer] service: Not on file     Active member of club or organization: Not on file     Attends meetings of clubs or organizations: Not on file     Relationship status: Not on file  Intimate partner violence     Fear of current or ex partner: Not on file     Emotionally abused: Not on file     Physically abused: Not on file     Forced sexual activity: Not on file   Other Topics Concern    Not on file   Social History Narrative    Not on file     Past Medical History:   Diagnosis Date    Allergic      No past surgical history on file  Allergies   Allergen Reactions    Other Headache     Seasonal allergies     Current Outpatient Medications on File Prior to Visit   Medication Sig Dispense Refill    loratadine (CLARITIN) 10 mg tablet Take 10 mg by mouth daily      naproxen sodium (ANAPROX) 550 mg tablet Take 1 tablet (550 mg total) by mouth 2 (two) times a day with meals for 30 doses As needed for cramps 30 tablet 3     No current facility-administered medications on file prior to visit  Review of Systems   Constitutional: Negative for chills, fever and unexpected weight change  HENT: Negative for hearing loss, nosebleeds and sore throat  Eyes: Negative for pain, redness and visual disturbance  Respiratory: Negative for cough, shortness of breath and wheezing  Cardiovascular: Negative for chest pain, palpitations and leg swelling  Gastrointestinal: Negative for abdominal pain, nausea and vomiting  Endocrine: Negative for polydipsia and polyuria  Genitourinary: Negative for dysuria and hematuria  Musculoskeletal:         As noted in HPI   Skin: Negative for rash and wound  Neurological: Positive for numbness  Negative for dizziness and headaches  Psychiatric/Behavioral: Negative for decreased concentration, dysphoric mood and suicidal ideas  The patient is not nervous/anxious  Objective: There were no vitals filed for this visit  Physical Exam  Constitutional:       Appearance: She is well-developed  HENT:      Head: Normocephalic and atraumatic  Eyes:      General: No scleral icterus       Conjunctiva/sclera: Conjunctivae normal    Neck: Musculoskeletal: Neck supple  Cardiovascular:      Comments: No discernible arrhthymias  Pulmonary:      Effort: Pulmonary effort is normal  No respiratory distress  Breath sounds: No stridor  Abdominal:      General: There is no distension  Palpations: Abdomen is soft  Skin:     General: Skin is warm and dry  Findings: No erythema  Neurological:      Mental Status: She is alert and oriented to person, place, and time  Psychiatric:         Behavior: Behavior normal          Right Shoulder Exam     Tenderness   The patient is experiencing no tenderness  Range of Motion   The patient has normal right shoulder ROM  Muscle Strength   The patient has normal right shoulder strength  Tests   Tamayo test: negative  Cross arm: negative  Impingement: negative  Drop arm: negative    Other   Erythema: absent  Scars: absent  Sensation: normal  Pulse: present    Comments:    Negative Spurling's test   negative nerve tension test   with deep palpation of the rhomboid musculature the patient reports radicular symptoms into the right arm into the lateral  Arm and dorsal forearm            I have personally reviewed pertinent films in PACS and my interpretation is  x-rays cervical spine demonstrates a minimal stable anterolisthesis C3 on C4,  otherwise she has a normal lordotic curvature of the cervical spine no other malalignment no degenerative changes  Patent neural foramen  Procedures  No Procedures performed today    Portions of the record may have been created with voice recognition software  Occasional wrong word or "sound a like" substitutions may have occurred due to the inherent limitations of voice recognition software  Read the chart carefully and recognize, using context, where substitutions have occurred

## 2021-05-04 ENCOUNTER — CONSULT (OUTPATIENT)
Dept: PAIN MEDICINE | Facility: CLINIC | Age: 21
End: 2021-05-04
Payer: COMMERCIAL

## 2021-05-04 ENCOUNTER — TRANSCRIBE ORDERS (OUTPATIENT)
Dept: PAIN MEDICINE | Facility: CLINIC | Age: 21
End: 2021-05-04

## 2021-05-04 VITALS
WEIGHT: 139 LBS | SYSTOLIC BLOOD PRESSURE: 110 MMHG | DIASTOLIC BLOOD PRESSURE: 72 MMHG | HEART RATE: 80 BPM | BODY MASS INDEX: 24.62 KG/M2

## 2021-05-04 DIAGNOSIS — M41.25 OTHER IDIOPATHIC SCOLIOSIS, THORACOLUMBAR REGION: ICD-10-CM

## 2021-05-04 DIAGNOSIS — M54.12 RADICULITIS OF RIGHT CERVICAL REGION: Primary | ICD-10-CM

## 2021-05-04 DIAGNOSIS — M54.2 NECK PAIN: ICD-10-CM

## 2021-05-04 DIAGNOSIS — M79.18 MYOFASCIAL PAIN SYNDROME: ICD-10-CM

## 2021-05-04 PROCEDURE — 3008F BODY MASS INDEX DOCD: CPT | Performed by: PHYSICAL MEDICINE & REHABILITATION

## 2021-05-04 PROCEDURE — 99244 OFF/OP CNSLTJ NEW/EST MOD 40: CPT | Performed by: PHYSICAL MEDICINE & REHABILITATION

## 2021-05-04 RX ORDER — MELOXICAM 15 MG/1
15 TABLET ORAL DAILY
Qty: 30 TABLET | Refills: 1 | Status: SHIPPED | OUTPATIENT
Start: 2021-05-04 | End: 2022-07-21 | Stop reason: SDUPTHER

## 2021-05-04 NOTE — PROGRESS NOTES
Assessment  1  Radiculitis of right cervical region    2  Other idiopathic scoliosis, thoracolumbar region    3  Neck pain    4  Myofascial pain syndrome        Plan  Ms Van Raman  Is a pleasant 28-year-old female who presents for initial evaluation regarding neck, bilateral shoulder and mid back pain and referral from Dr Damien Ospina  For midthoracic back pain and suspected cervical radiculitis  During today's evaluation she is demonstrating clinical evidence of cervical radiculopathy as well as myofascial pain syndrome in the bilateral thoracic paraspinals  She does have an underlying history of idiopathic scoliosis that has not required any surgical or bracing intervention  At this time patient is already completed several weeks of physical therapy which patient reports has made her pain worse, however, she still continues with home exercises with minimal relief  At this time further diagnostic workup would be beneficial and warranted  1  Order cervical spine MRI without contrast to better identify disc and spine pathology contributing to symptoms   2  Will start meloxicam 15 mg daily and stop naproxen   3  I offered the patient bilateral thoracic paraspinal trigger point injections under ultrasound guidance, however, at this time patient wishes to continue with more conservative approaches and consider a injections  Will follow-up    My impressions and treatment recommendations were discussed in detail with the patient who verbalized understanding and had no further questions  Discharge instructions were provided  I personally saw and examined the patient and I agree with the above discussed plan of care  Orders Placed This Encounter   Procedures    MRI cervical spine without contrast     Standing Status:   Future     Standing Expiration Date:   5/4/2025     Scheduling Instructions: There is no preparation for this test  Please leave your jewelry and valuables at home, wedding rings are the exception  Magnetic nail polish must be removed prior to arrival for your test  Please bring your insurance cards, a form of photo ID and a list of your medications with you  Arrive 15 minutes prior to your appointment time in order to register  Please bring any prior CT or MRI studies of this area that were not performed at a [facility] facility  To schedule this appointment, please contact Central Scheduling at [phone]  Prior to your appointment, please make sure you complete the MRI Screening Form when you e-Check in for your appointment  This will be available starting 7 days before your appointment in [address]  You may receive an e-mail with an activation code if you do not have a [employer] account  If you do not have access to a device, we will complete your screening at your appointment  Order Specific Question:   Is the patient pregnant? Answer:   No     Order Specific Question:   What is the patient's sedation requirement? Answer:   No Sedation     Order Specific Question:   Release to patient through [employer]     Answer:   Immediate     Order Specific Question:   Is order priority selected as STAT? Answer:   No     Order Specific Question:   Reason for Exam (FREE TEXT)     Answer:   cervical radiculopathy     New Medications Ordered This Visit   Medications    meloxicam (MOBIC) 15 mg tablet     Sig: Take 1 tablet (15 mg total) by mouth daily     Dispense:  30 tablet     Refill:  1       History of Present Illness    Javier Mckee is a 24 y o  female  Presents to [facility] for initial evaluation regarding 3 months duration of neck and mid back pain  Patient denies any significant inciting event or recent trauma  Today she reports moderate to severe pain rated 7/10 but not interfering with daily activities  Pain is nearly constant 60 95% of the time that is worse in the morning and the evening    Describes symptoms of shooting, numbness, sharp, pins needles, pressure-like, dull aching pain  Also reports upper extremity weakness but denies falls  Does not use any durable medical equipment for ambulation  Symptoms are worse with lying down, bending, sitting  She has not had any relief with physical therapy or exercise or heat and ice  Currently taking naproxen which is provided minimal relief  Presents today for initial evaluation  Pt has been referred by Dr Valarie Leon for   Cervical radiculopathy and thoracic back pain with mild idiopathic thoracolumbar scoliosis history  I have personally reviewed and/or updated the patient's past medical history, past surgical history, family history, social history, current medications, allergies, and vital signs today  Review of Systems   Constitutional: Negative for fever and unexpected weight change  HENT: Negative for trouble swallowing  Eyes: Negative for visual disturbance  Respiratory: Negative for shortness of breath and wheezing  Cardiovascular: Negative for chest pain and palpitations  Gastrointestinal: Negative for constipation, diarrhea, nausea and vomiting  Endocrine: Negative for cold intolerance, heat intolerance and polydipsia  Genitourinary: Negative for difficulty urinating and frequency  Musculoskeletal: Positive for joint swelling  Negative for arthralgias, gait problem and myalgias  Skin: Negative for rash  Neurological: Positive for weakness and numbness  Negative for dizziness, seizures, syncope and headaches  Hematological: Does not bruise/bleed easily  Psychiatric/Behavioral: Negative for dysphoric mood  All other systems reviewed and are negative        Patient Active Problem List   Diagnosis    Menorrhagia with regular cycle    Acute bilateral thoracic back pain    Seasonal allergies    Idiopathic scoliosis    Radiculitis of right cervical region    Arm paresthesia, right       Past Medical History:   Diagnosis Date    Allergic        No past surgical history on file  Family History   Problem Relation Age of Onset    Hypertension Father     Hyperlipidemia Father     Hypertension Paternal Grandmother        Social History     Occupational History    Not on file   Tobacco Use    Smoking status: Never Smoker    Smokeless tobacco: Never Used   Substance and Sexual Activity    Alcohol use: Not Currently    Drug use: Not Currently    Sexual activity: Yes     Partners: Male     Birth control/protection: Condom Male       Current Outpatient Medications on File Prior to Visit   Medication Sig    loratadine (CLARITIN) 10 mg tablet Take 10 mg by mouth daily    metaxalone (SKELAXIN) 800 mg tablet Take 1 tablet (800 mg total) by mouth 3 (three) times a day    methylPREDNISolone 4 MG tablet therapy pack Use as directed on package    [DISCONTINUED] naproxen sodium (ANAPROX) 550 mg tablet Take 1 tablet (550 mg total) by mouth 2 (two) times a day with meals for 30 doses As needed for cramps     No current facility-administered medications on file prior to visit  Allergies   Allergen Reactions    Other Headache     Seasonal allergies       Physical Exam    /72   Pulse 80   Wt 63 kg (139 lb)   BMI 24 62 kg/m²     Constitutional: normal, well developed, well nourished, alert, in no distress and non-toxic and no overt pain behavior    Eyes: anicteric  HEENT: grossly intact  Neck: supple, symmetric, trachea midline and no masses   Pulmonary:even and unlabored  Cardiovascular:No edema or pitting edema present  Skin:Normal without rashes or lesions and well hydrated  Psychiatric:Mood and affect appropriate  Neurologic:Cranial Nerves II-XII grossly intact  Musculoskeletal:  Normal gait, tenderness to palpation bilateral cervical thoracic paraspinals, decreased active and passive range of motion with left-sided side-bending and rotation limited by pain, MMT 5/5 bilateral upper extremities, sensation grossly intact to light touch, Spurling test negative, DTRs within normal limits    Imaging      SCOLIOSIS      INDICATION:   M54 6: Pain in thoracic spine      COMPARISON:  None     VIEWS:  XR ENTIRE SPINE (SCOLIOSIS) 4-5 VW         FINDINGS:     There is no fracture, pathologic bone lesion or congenital segmentation anomaly      Mild thoracolumbar scoliosis noted  Measuring from the superior endplate of R09 to the inferior endplate of L3, convex right curvature with Panda angle of 10 degrees noted   There is minimal levoscoliosis of the inferior thoracic spine and then slightly   dextroscoliosis present further superiorly, Panda angle 8 degrees measuring from the superior endplate of T4 to the inferior endplate of T8      IMPRESSION:     Mild thoracolumbar scoliosis     No acute osseous abnormality

## 2021-05-04 NOTE — PATIENT INSTRUCTIONS
Musculoskeletal Pain   WHAT YOU NEED TO KNOW:   Muscle pain can be dull, achy, or sharp  You may have pain and tenderness to the touch as well  It can occur anywhere on your body and is often brought on by exercise  Muscle pain may occur from an injury, such as a sprain, tendonitis, or bone fracture  Muscle pain can also be the result of medical conditions, such as polymyositis, fibromyalgia, and connective tissue disorders  DISCHARGE INSTRUCTIONS:   Self care:   · Rest  as directed and avoid activity that causes pain  You may be able to return to normal activity when you can move without pain  Follow directions for rest and activity  You are at risk for injury for 3 weeks after your symptoms go away  · Ice  your painful muscle area to decrease pain and swelling  Use an ice pack, or put ice in a plastic bag and cover it with a towel  Always  put a cloth between the ice and your skin  Apply the ice as often as directed for the first 24 to 48 hours  · Compression  with a splint, brace, or elastic bandage helps decrease pain and swelling  This may be needed for muscle pain in arms or legs  A splint, brace, or bandage will also help protect the painful area when you move around  · Elevate  a painful arm or leg to reduce swelling and pain  Elevate your limb while you are sitting or lying down  Prop a painful leg on pillows to keep it above the level of your heart  Medicines:   · NSAIDs  help decrease swelling and pain or fever  This medicine is available with or without a doctor's order  NSAIDs can cause stomach bleeding or kidney problems in certain people  If you take blood thinner medicine, always ask your healthcare provider if NSAIDs are safe for you  Always read the medicine label and follow directions  · Acetaminophen  is used to decrease pain  It is available without a doctor's order  Ask your healthcare provider how much to take and when to take it  Follow directions   Acetaminophen can cause liver damage if not taken correctly  Do not take more than one medicine that contains acetaminophen unless directed  · Muscle relaxers  help relax your muscles to decrease pain and muscle spasms  · Steroids  may be given to decrease redness, pain, and swelling  · Take your medicine as directed  Contact your healthcare provider if you think your medicine is not helping or if you have side effects  Tell him if you are allergic to any medicine  Keep a list of the medicines, vitamins, and herbs you take  Include the amounts, and when and why you take them  Bring the list or the pill bottles to follow-up visits  Carry your medicine list with you in case of an emergency  Follow up with your healthcare provider as directed: You may need more tests to help healthcare providers find the cause of your muscle pain  You may need physical therapy to learn muscle strengthening exercises  Write down your questions so you remember to ask them during your visits  Contact your healthcare provider if:   · You have a fever  · You have trouble sleeping because of your pain  · Your painful area becomes more tender, red, and warm to the touch  · You have decreased movement of the painful area  · You have questions or concerns about your condition or care  Return to the emergency department if:   · You have increased severe pain when you move the painful muscle area  · You lose feeling in your painful muscle area  · You have new or worse swelling in the painful area  Your skin may feel tight  · You have increased muscle pain or pain that does not improve with treatment  © Copyright [address] Information is for End User's use only and may not be sold, redistributed or otherwise used for commercial purposes  All illustrations and images included in CareNotes® are the copyrighted property of [employer]  or [employer]  The above information is an  only   It is not intended as medical advice for individual conditions or treatments  Talk to your doctor, nurse or pharmacist before following any medical regimen to see if it is safe and effective for you

## 2021-05-13 NOTE — PROGRESS NOTES
PT Discharge    Today's date: 2021  Patient name: Johana Gomes  : 2000  MRN: [id number]  Referring provider: Daylin Sampson DO  Dx:   Encounter Diagnosis     ICD-10-CM    1  Acute bilateral thoracic back pain  M54 6        Start Time: 0900  Stop Time: 925  Total time in clinic (min): 25 minutes    Assessment/Plan   Pt has not been present since 21  Pt's chart will be DC in compliance of facility policy as all Charts are DC within 30 days of last scheduled visit        Subjective    Objective    Flowsheet Rows      Most Recent Value   PT/OT G-Codes   Current Score  67   Projected Score  85

## 2021-06-03 ENCOUNTER — TELEPHONE (OUTPATIENT)
Dept: NEUROLOGY | Facility: CLINIC | Age: 21
End: 2021-06-03

## 2021-06-03 NOTE — TELEPHONE ENCOUNTER
Patient declined services  No current symptoms  Closed out referral  Offered our services in the future

## 2021-06-09 ENCOUNTER — HOSPITAL ENCOUNTER (OUTPATIENT)
Dept: MRI IMAGING | Facility: HOSPITAL | Age: 21
Discharge: HOME | End: 2021-06-09
Attending: PHYSICAL MEDICINE & REHABILITATION
Payer: COMMERCIAL

## 2021-06-09 DIAGNOSIS — M41.25 OTHER IDIOPATHIC SCOLIOSIS, THORACOLUMBAR REGION: ICD-10-CM

## 2021-06-09 DIAGNOSIS — M54.2 NECK PAIN: ICD-10-CM

## 2021-06-09 DIAGNOSIS — M54.12 RADICULITIS OF RIGHT CERVICAL REGION: ICD-10-CM

## 2021-06-09 PROCEDURE — G1004 CDSM NDSC: HCPCS

## 2021-06-09 PROCEDURE — 72141 MRI NECK SPINE W/O DYE: CPT

## 2021-06-14 ENCOUNTER — TELEPHONE (OUTPATIENT)
Dept: PAIN MEDICINE | Facility: CLINIC | Age: 21
End: 2021-06-14

## 2021-06-15 NOTE — TELEPHONE ENCOUNTER
S/W pt who had a questions about her Cervical MRI and that it states there was no scoliosis, however she was diagnosed with it before  Explained to pt where her scoliosis was seen on prior Xray was much lower than the area covered by the Cervical MRI  Pt verbalized understanding

## 2021-07-20 ENCOUNTER — TRANSCRIBE ORDERS (OUTPATIENT)
Dept: URGENT CARE | Facility: CLINIC | Age: 21
End: 2021-07-20

## 2021-07-20 DIAGNOSIS — Z02.1 ENCOUNTER FOR PRE-EMPLOYMENT EXAMINATION: Primary | ICD-10-CM

## 2021-07-20 PROCEDURE — 86765 RUBEOLA ANTIBODY: CPT | Performed by: PHYSICIAN ASSISTANT

## 2021-07-20 PROCEDURE — 86787 VARICELLA-ZOSTER ANTIBODY: CPT | Performed by: PHYSICIAN ASSISTANT

## 2021-07-20 PROCEDURE — 86480 TB TEST CELL IMMUN MEASURE: CPT | Performed by: PHYSICIAN ASSISTANT

## 2021-07-20 PROCEDURE — 86762 RUBELLA ANTIBODY: CPT | Performed by: PHYSICIAN ASSISTANT

## 2021-07-20 PROCEDURE — 86735 MUMPS ANTIBODY: CPT | Performed by: PHYSICIAN ASSISTANT

## 2021-08-17 ENCOUNTER — HOSPITAL ENCOUNTER (EMERGENCY)
Facility: HOSPITAL | Age: 21
Discharge: HOME | End: 2021-08-18
Attending: EMERGENCY MEDICINE | Admitting: EMERGENCY MEDICINE
Payer: COMMERCIAL

## 2021-08-17 ENCOUNTER — APPOINTMENT (EMERGENCY)
Dept: CT IMAGING | Facility: HOSPITAL | Age: 21
End: 2021-08-17
Payer: COMMERCIAL

## 2021-08-17 DIAGNOSIS — R91.1 PULMONARY NODULE: ICD-10-CM

## 2021-08-17 DIAGNOSIS — R19.7 DIARRHEA: ICD-10-CM

## 2021-08-17 DIAGNOSIS — R10.31 RIGHT LOWER QUADRANT ABDOMINAL PAIN: Primary | ICD-10-CM

## 2021-08-17 LAB
ALBUMIN SERPL BCP-MCNC: 4.7 G/DL (ref 3.5–5)
ALP SERPL-CCNC: 89 U/L (ref 46–384)
ALT SERPL W P-5'-P-CCNC: 21 U/L (ref 12–78)
ANION GAP SERPL CALCULATED.3IONS-SCNC: 11 MMOL/L (ref 4–13)
APTT PPP: 27 SECONDS (ref 23–37)
AST SERPL W P-5'-P-CCNC: 18 U/L (ref 5–45)
BACTERIA UR QL AUTO: ABNORMAL /HPF
BASOPHILS # BLD AUTO: 0.03 THOUSANDS/ΜL (ref 0–0.1)
BASOPHILS NFR BLD AUTO: 0 % (ref 0–1)
BILIRUB SERPL-MCNC: 1.48 MG/DL (ref 0.2–1)
BILIRUB UR QL STRIP: NEGATIVE
BUN SERPL-MCNC: 13 MG/DL (ref 5–25)
CALCIUM SERPL-MCNC: 9.9 MG/DL (ref 8.3–10.1)
CHLORIDE SERPL-SCNC: 104 MMOL/L (ref 100–108)
CLARITY UR: ABNORMAL
CO2 SERPL-SCNC: 25 MMOL/L (ref 21–32)
COLOR UR: YELLOW
CREAT SERPL-MCNC: 1.07 MG/DL (ref 0.6–1.3)
EOSINOPHIL # BLD AUTO: 0.06 THOUSAND/ΜL (ref 0–0.61)
EOSINOPHIL NFR BLD AUTO: 1 % (ref 0–6)
ERYTHROCYTE [DISTWIDTH] IN BLOOD BY AUTOMATED COUNT: 12.1 % (ref 11.6–15.1)
EXT PREG TEST URINE: NEGATIVE
EXT. CONTROL ED NAV: NORMAL
GFR SERPL CREATININE-BSD FRML MDRD: 75 ML/MIN/1.73SQ M
GLUCOSE SERPL-MCNC: 95 MG/DL (ref 65–140)
GLUCOSE UR STRIP-MCNC: NEGATIVE MG/DL
HCG SERPL QL: NEGATIVE
HCT VFR BLD AUTO: 43.4 % (ref 34.8–46.1)
HGB BLD-MCNC: 15.3 G/DL (ref 11.5–15.4)
HGB UR QL STRIP.AUTO: NEGATIVE
IMM GRANULOCYTES # BLD AUTO: 0.06 THOUSAND/UL (ref 0–0.2)
IMM GRANULOCYTES NFR BLD AUTO: 1 % (ref 0–2)
INR PPP: 0.94 (ref 0.84–1.19)
KETONES UR STRIP-MCNC: ABNORMAL MG/DL
LACTATE SERPL-SCNC: 1.5 MMOL/L (ref 0.5–2)
LEUKOCYTE ESTERASE UR QL STRIP: NEGATIVE
LIPASE SERPL-CCNC: 51 U/L (ref 73–393)
LYMPHOCYTES # BLD AUTO: 2.48 THOUSANDS/ΜL (ref 0.6–4.47)
LYMPHOCYTES NFR BLD AUTO: 28 % (ref 14–44)
MCH RBC QN AUTO: 34.4 PG (ref 26.8–34.3)
MCHC RBC AUTO-ENTMCNC: 35.3 G/DL (ref 31.4–37.4)
MCV RBC AUTO: 98 FL (ref 82–98)
MONOCYTES # BLD AUTO: 0.56 THOUSAND/ΜL (ref 0.17–1.22)
MONOCYTES NFR BLD AUTO: 6 % (ref 4–12)
MUCOUS THREADS UR QL AUTO: ABNORMAL
NEUTROPHILS # BLD AUTO: 5.57 THOUSANDS/ΜL (ref 1.85–7.62)
NEUTS SEG NFR BLD AUTO: 64 % (ref 43–75)
NITRITE UR QL STRIP: NEGATIVE
NON-SQ EPI CELLS URNS QL MICRO: ABNORMAL /HPF
NRBC BLD AUTO-RTO: 0 /100 WBCS
PH UR STRIP.AUTO: 5.5 [PH] (ref 4.5–8)
PLATELET # BLD AUTO: 278 THOUSANDS/UL (ref 149–390)
PMV BLD AUTO: 9.6 FL (ref 8.9–12.7)
POTASSIUM SERPL-SCNC: 3.8 MMOL/L (ref 3.5–5.3)
PROT SERPL-MCNC: 8.2 G/DL (ref 6.4–8.2)
PROT UR STRIP-MCNC: ABNORMAL MG/DL
PROTHROMBIN TIME: 12.7 SECONDS (ref 11.6–14.5)
RBC # BLD AUTO: 4.45 MILLION/UL (ref 3.81–5.12)
RBC #/AREA URNS AUTO: ABNORMAL /HPF
SODIUM SERPL-SCNC: 140 MMOL/L (ref 136–145)
SP GR UR STRIP.AUTO: >=1.03 (ref 1–1.03)
UROBILINOGEN UR QL STRIP.AUTO: 0.2 E.U./DL
WBC # BLD AUTO: 8.76 THOUSAND/UL (ref 4.31–10.16)
WBC #/AREA URNS AUTO: ABNORMAL /HPF

## 2021-08-17 PROCEDURE — 80053 COMPREHEN METABOLIC PANEL: CPT | Performed by: EMERGENCY MEDICINE

## 2021-08-17 PROCEDURE — 85025 COMPLETE CBC W/AUTO DIFF WBC: CPT | Performed by: EMERGENCY MEDICINE

## 2021-08-17 PROCEDURE — 99284 EMERGENCY DEPT VISIT MOD MDM: CPT

## 2021-08-17 PROCEDURE — G1004 CDSM NDSC: HCPCS

## 2021-08-17 PROCEDURE — 87040 BLOOD CULTURE FOR BACTERIA: CPT | Performed by: EMERGENCY MEDICINE

## 2021-08-17 PROCEDURE — 81001 URINALYSIS AUTO W/SCOPE: CPT

## 2021-08-17 PROCEDURE — 85610 PROTHROMBIN TIME: CPT | Performed by: EMERGENCY MEDICINE

## 2021-08-17 PROCEDURE — 99284 EMERGENCY DEPT VISIT MOD MDM: CPT | Performed by: EMERGENCY MEDICINE

## 2021-08-17 PROCEDURE — 85730 THROMBOPLASTIN TIME PARTIAL: CPT | Performed by: EMERGENCY MEDICINE

## 2021-08-17 PROCEDURE — 74177 CT ABD & PELVIS W/CONTRAST: CPT

## 2021-08-17 PROCEDURE — 83690 ASSAY OF LIPASE: CPT | Performed by: EMERGENCY MEDICINE

## 2021-08-17 PROCEDURE — 84703 CHORIONIC GONADOTROPIN ASSAY: CPT | Performed by: EMERGENCY MEDICINE

## 2021-08-17 PROCEDURE — 83605 ASSAY OF LACTIC ACID: CPT | Performed by: EMERGENCY MEDICINE

## 2021-08-17 PROCEDURE — 36415 COLL VENOUS BLD VENIPUNCTURE: CPT | Performed by: EMERGENCY MEDICINE

## 2021-08-17 PROCEDURE — 87086 URINE CULTURE/COLONY COUNT: CPT | Performed by: EMERGENCY MEDICINE

## 2021-08-17 PROCEDURE — 81025 URINE PREGNANCY TEST: CPT | Performed by: EMERGENCY MEDICINE

## 2021-08-17 PROCEDURE — 96360 HYDRATION IV INFUSION INIT: CPT

## 2021-08-17 PROCEDURE — 96361 HYDRATE IV INFUSION ADD-ON: CPT

## 2021-08-17 RX ORDER — SODIUM CHLORIDE 9 MG/ML
125 INJECTION, SOLUTION INTRAVENOUS CONTINUOUS
Status: DISCONTINUED | OUTPATIENT
Start: 2021-08-17 | End: 2021-08-18 | Stop reason: HOSPADM

## 2021-08-17 RX ADMIN — IOHEXOL 100 ML: 350 INJECTION, SOLUTION INTRAVENOUS at 23:23

## 2021-08-17 RX ADMIN — SODIUM CHLORIDE 1000 ML: 0.9 INJECTION, SOLUTION INTRAVENOUS at 22:32

## 2021-08-17 NOTE — Clinical Note
Sunil Bagley was seen and treated in our emergency department on 8/17/2021  Diagnosis:     Olya    She may return on this date:     No work for one night       If you have any questions or concerns, please don't hesitate to call        Hermelinda Covington MD    ______________________________           _______________          _______________  Hospital Representative                              Date                                Time

## 2021-08-18 VITALS
BODY MASS INDEX: 24.63 KG/M2 | HEART RATE: 66 BPM | TEMPERATURE: 98.3 F | RESPIRATION RATE: 18 BRPM | DIASTOLIC BLOOD PRESSURE: 72 MMHG | HEIGHT: 63 IN | OXYGEN SATURATION: 100 % | WEIGHT: 139 LBS | SYSTOLIC BLOOD PRESSURE: 130 MMHG

## 2021-08-18 NOTE — ED PROVIDER NOTES
History  Chief Complaint   Patient presents with    Diarrhea     diarrhea starting yesterday with emir RLQ pain     Patient is a 24year old female with diarrhea since yesterday and RLQ pain since today and constant  No N/V  No fever  No urinary sx  No travel  No ill contacts  No cough  States she drove here  LMP - 8/3/21  Was sent from [facility] tonight to rule out appendicitis  [facility] website checked on this patient and no Rx found  Patient declines pain medication at this time  No raw meat, eggs, fish or recent abx use  No abdominal surgery  Patient is a ED tech here in training  No recent old records from this ED seen on computer system  (+) decreased appetite  History provided by:  Patient and parent   used: No    Diarrhea  Associated symptoms: abdominal pain    Associated symptoms: no fever and no vomiting        Prior to Admission Medications   Prescriptions Last Dose Informant Patient Reported? Taking?   loratadine (CLARITIN) 10 mg tablet  Self Yes No   Sig: Take 10 mg by mouth daily   meloxicam (MOBIC) 15 mg tablet   No No   Sig: Take 1 tablet (15 mg total) by mouth daily   metaxalone (SKELAXIN) 800 mg tablet   No No   Sig: Take 1 tablet (800 mg total) by mouth 3 (three) times a day   methylPREDNISolone 4 MG tablet therapy pack   No No   Sig: Use as directed on package      Facility-Administered Medications: None       Past Medical History:   Diagnosis Date    Allergic        History reviewed  No pertinent surgical history  Family History   Problem Relation Age of Onset    Hypertension Father     Hyperlipidemia Father     Hypertension Paternal Grandmother      I have reviewed and agree with the history as documented      E-Cigarette/Vaping    E-Cigarette Use Never User      E-Cigarette/Vaping Substances    Nicotine No     THC No     CBD No     Flavoring No     Other No     Unknown No      Social History     Tobacco Use    Smoking status: Never Smoker    Smokeless tobacco: Never Used   Vaping Use    Vaping Use: Never used   Substance Use Topics    Alcohol use: Not Currently    Drug use: Not Currently       Review of Systems   Constitutional: Positive for appetite change  Negative for fever  Respiratory: Negative for cough  Gastrointestinal: Positive for abdominal pain and diarrhea  Negative for blood in stool, nausea and vomiting  Genitourinary: Negative for difficulty urinating  All other systems reviewed and are negative  Physical Exam  Physical Exam  Vitals and nursing note reviewed  Constitutional:       General: She is in acute distress (moderate)  HENT:      Head: Normocephalic and atraumatic  Mouth/Throat:      Mouth: Mucous membranes are moist       Pharynx: No posterior oropharyngeal erythema  Eyes:      General: No scleral icterus  Cardiovascular:      Rate and Rhythm: Regular rhythm  Tachycardia present  Heart sounds: Normal heart sounds  No murmur heard  Pulmonary:      Effort: Pulmonary effort is normal  No respiratory distress  Breath sounds: Normal breath sounds  No stridor  No wheezing, rhonchi or rales  Abdominal:      General: Bowel sounds are normal  There is no distension  Palpations: Abdomen is soft  Tenderness: There is abdominal tenderness (RLQ)  There is no guarding or rebound  Musculoskeletal:         General: No deformity  Cervical back: Normal range of motion and neck supple  Right lower leg: No edema  Left lower leg: No edema  Skin:     General: Skin is warm and dry  Coloration: Skin is not jaundiced  Findings: No erythema or rash  Neurological:      General: No focal deficit present  Mental Status: She is alert and oriented to person, place, and time     Psychiatric:         Mood and Affect: Mood normal          Vital Signs  ED Triage Vitals [08/17/21 2145]   Temperature Pulse Respirations Blood Pressure SpO2   98 3 °F (36 8 °C) 103 18 155/87 100 % Temp Source Heart Rate Source Patient Position - Orthostatic VS BP Location FiO2 (%)   Oral Monitor Sitting Left arm --      Pain Score       7           Vitals:    08/17/21 2145 08/18/21 0029   BP: 155/87 130/72   Pulse: 103 66   Patient Position - Orthostatic VS: Sitting Lying         Visual Acuity      ED Medications  Medications   sodium chloride 0 9 % infusion (0 mL/hr Intravenous Hold 8/17/21 2233)   sodium chloride 0 9 % bolus 1,000 mL (0 mL Intravenous Stopped 8/18/21 0028)   iohexol (OMNIPAQUE) 350 MG/ML injection (SINGLE-DOSE) 100 mL (100 mL Intravenous Given 8/17/21 2323)       Diagnostic Studies  Results Reviewed     Procedure Component Value Units Date/Time    Blood culture #2 [[id number]] Collected: 08/17/21 2343    Lab Status: In process Specimen: Blood from Arm, Right Updated: 08/17/21 2356    Urine culture [[id number]] Collected: 08/17/21 2339    Lab Status: In process Specimen: Urine, Clean Catch Updated: 08/17/21 2340    Urine Microscopic [[id number]]  (Abnormal) Collected: 08/17/21 2241    Lab Status: Final result Specimen: Urine, Clean Catch Updated: 08/17/21 2328     RBC, UA 0-1 /hpf      WBC, UA 2-4 /hpf      Epithelial Cells Innumerable /hpf      Bacteria, UA Moderate /hpf      MUCUS THREADS Occasional    Lactic acid [[id number]]  (Normal) Collected: 08/17/21 2236    Lab Status: Final result Specimen: Blood from Arm, Right Updated: 08/17/21 2323     LACTIC ACID 1 5 mmol/L     Narrative:      Result may be elevated if tourniquet was used during collection      hCG, qualitative pregnancy [[id number]]  (Normal) Collected: 08/17/21 2236    Lab Status: Final result Specimen: Blood from Arm, Right Updated: 08/17/21 2322     Preg, Serum Negative    Lipase [[id number]]  (Abnormal) Collected: 08/17/21 2236    Lab Status: Final result Specimen: Blood from Arm, Right Updated: 08/17/21 2313     Lipase 51 u/L     Comprehensive metabolic panel [[id number]]  (Abnormal) Collected: 08/17/21 2236    Lab Status: Final result Specimen: Blood from Arm, Right Updated: 08/17/21 2306     Sodium 140 mmol/L      Potassium 3 8 mmol/L      Chloride 104 mmol/L      CO2 25 mmol/L      ANION GAP 11 mmol/L      BUN 13 mg/dL      Creatinine 1 07 mg/dL      Glucose 95 mg/dL      Calcium 9 9 mg/dL      AST 18 U/L      ALT 21 U/L      Alkaline Phosphatase 89 U/L      Total Protein 8 2 g/dL      Albumin 4 7 g/dL      Total Bilirubin 1 48 mg/dL      eGFR 75 ml/min/1 73sq m     Narrative:      National Kidney Disease Foundation guidelines for Chronic Kidney Disease (CKD):     Stage 1 with normal or high GFR (GFR > 90 mL/min/1 73 square meters)    Stage 2 Mild CKD (GFR = 60-89 mL/min/1 73 square meters)    Stage 3A Moderate CKD (GFR = 45-59 mL/min/1 73 square meters)    Stage 3B Moderate CKD (GFR = 30-44 mL/min/1 73 square meters)    Stage 4 Severe CKD (GFR = 15-29 mL/min/1 73 square meters)    Stage 5 End Stage CKD (GFR <15 mL/min/1 73 square meters)  Note: GFR calculation is accurate only with a steady state creatinine    Protime-INR [[id number]]  (Normal) Collected: 08/17/21 2236    Lab Status: Final result Specimen: Blood from Arm, Right Updated: 08/17/21 2305     Protime 12 7 seconds      INR 0 94    APTT [[id number]]  (Normal) Collected: 08/17/21 2236    Lab Status: Final result Specimen: Blood from Arm, Right Updated: 08/17/21 2305     PTT 27 seconds     CBC and differential [[id number]]  (Abnormal) Collected: 08/17/21 2236    Lab Status: Final result Specimen: Blood from Arm, Right Updated: 08/17/21 2251     WBC 8 76 Thousand/uL      RBC 4 45 Million/uL      Hemoglobin 15 3 g/dL      Hematocrit 43 4 %      MCV 98 fL      MCH 34 4 pg      MCHC 35 3 g/dL      RDW 12 1 %      MPV 9 6 fL      Platelets 986 Thousands/uL      nRBC 0 /100 WBCs      Neutrophils Relative 64 %      Immat GRANS % 1 %      Lymphocytes Relative 28 %      Monocytes Relative 6 %      Eosinophils Relative 1 %      Basophils Relative 0 %      Neutrophils Absolute 5 57 Thousands/µL      Immature Grans Absolute 0 06 Thousand/uL      Lymphocytes Absolute 2 48 Thousands/µL      Monocytes Absolute 0 56 Thousand/µL      Eosinophils Absolute 0 06 Thousand/µL      Basophils Absolute 0 03 Thousands/µL     POCT pregnancy, urine [[id number]]  (Normal) Resulted: 08/17/21 2244    Lab Status: Final result Updated: 08/17/21 2244     EXT PREG TEST UR (Ref: Negative) Negative     Control Valid    Urine Macroscopic, POC [[id number]]  (Abnormal) Collected: 08/17/21 2241    Lab Status: Final result Specimen: Urine Updated: 08/17/21 2242     Color, UA Yellow     Clarity, UA Slightly Cloudy     pH, UA 5 5     Leukocytes, UA Negative     Nitrite, UA Negative     Protein,  (2+) mg/dl      Glucose, UA Negative mg/dl      Ketones, UA 80 (3+) mg/dl      Urobilinogen, UA 0 2 E U /dl      Bilirubin, UA Negative     Blood, UA Negative     Specific Gravity, UA >=1 030    Narrative:      CLINITEK RESULT    Blood culture #1 [[id number]] Collected: 08/17/21 2236    Lab Status: In process Specimen: Blood from Arm, Right Updated: 08/17/21 2241                 CT abdomen pelvis with contrast   ED Interpretation by Fabian Reddy MD (08/18 0032)   FINDINGS:     ABDOMEN     LOWER CHEST:  There is a 3 mm left lower lobe pulmonary nodule  Mild atelectatic changes are noted at the posterior left lung base      LIVER/BILIARY TREE:  Unremarkable      GALLBLADDER:  No calcified gallstones  No pericholecystic inflammatory change      SPLEEN:  Unremarkable      PANCREAS:  Unremarkable      ADRENAL GLANDS:  Unremarkable      KIDNEYS/URETERS:  Unremarkable  No hydronephrosis      STOMACH AND BOWEL:  Unremarkable      APPENDIX:  A normal appendix was visualized      ABDOMINOPELVIC CAVITY:  No ascites  No pneumoperitoneum    No lymphadenopathy      VESSELS:  Unremarkable for patient's age      PELVIS     REPRODUCTIVE ORGANS:  Unremarkable for patient's age      URINARY BLADDER:  Unremarkable      ABDOMINAL WALL/INGUINAL REGIONS:  Unremarkable      OSSEOUS STRUCTURES:  No acute fracture or destructive osseous lesion      IMPRESSION:     No acute intra-abdominal abnormality  No free air or free fluid      Normal appendix visualized      3 m   m left lower lobe pulmonary nodule  Based on current Fleischner Society 2017 Guidelines on incidental pulmonary nodule, no routine follow-up is needed if the patient is considered low risk for lung cancer  If the patient is considered high risk for   lung cancer, 12 month follow-up non-contrast chest CT is recommended         Workstation performed: [id number]      Final Result by Anu Wilson MD (08/18 8881)      No acute intra-abdominal abnormality  No free air or free fluid  Normal appendix visualized  3 mm left lower lobe pulmonary nodule  Based on current Fleischner Society 2017 Guidelines on incidental pulmonary nodule, no routine follow-up is needed if the patient is considered low risk for lung cancer  If the patient is considered high risk for    lung cancer, 12 month follow-up non-contrast chest CT is recommended  Workstation performed: [id number]                    Procedures  Procedures         ED Course  ED Course as of Aug 18 0112   Tue Aug 17, 2021   2310 Labs d/w patient and father with patient's permission  Wed Aug 18, 2021   0110 CT d/w patient and father  No acute abdomen prior to discharge  Patient declines abx for UA results but many epis so will wait for cx results                                                 MDM  Number of Diagnoses or Management Options  Diagnosis management comments: DDx including but not limited to: appendicitis, gastroenteritis, gastritis, PUD, GERD, gastroparesis, hepatitis, pancreatitis, colitis, enteritis, food poisoning, mesenteric adenitis, IBD, IBS, ileus, bowel obstruction, volvulus, cholecystitis, biliary colic, choledocholithiasis, perforated viscus, tumor, splenic etiology, diverticulitis, internal hernia, constipation, pelvic pathology, renal colic, pyelonephritis, UTI  Amount and/or Complexity of Data Reviewed  Clinical lab tests: ordered and reviewed  Tests in the radiology section of CPT®: ordered and reviewed  Decide to obtain previous medical records or to obtain history from someone other than the patient: yes  Independent visualization of images, tracings, or specimens: yes        Disposition  Final diagnoses:   Right lower quadrant abdominal pain   Diarrhea   Pulmonary nodule     Time reflects when diagnosis was documented in both MDM as applicable and the Disposition within this note     Time User Action Codes Description Comment    8/17/2021 11:14 PM Brayden Fears Add [R10 31] Right lower quadrant abdominal pain     8/17/2021 11:14 PM Brayden Fears Add [R19 7] Diarrhea     8/18/2021  1:07 AM Brayden Wright Add [R91 1] Pulmonary nodule       ED Disposition     ED Disposition Condition Date/Time Comment    Discharge Stable Wed Aug 18, 2021  1:11 AM Lore Blackmon discharge to home/self care  Follow-up Information     Follow up With Specialties Details Why [address] Jenny Dominguez MD Internal Medicine Call in 1 day Return sooner if increased pain, fever, vomiting, worsening diarrhea, difficulty urinating  motrin/tylenol for pain  [phone]            Patient's Medications   Discharge Prescriptions    No medications on file     No discharge procedures on file      PDMP Review       Value Time User    PDMP Reviewed  Yes 8/17/2021 10:20 PM Julian Ayala MD          ED Provider  Electronically Signed by           Julian Ayala MD  08/18/21 0754

## 2021-08-19 LAB — BACTERIA UR CULT: NORMAL

## 2021-08-23 LAB
BACTERIA BLD CULT: NORMAL
BACTERIA BLD CULT: NORMAL

## 2022-07-21 ENCOUNTER — OFFICE VISIT (OUTPATIENT)
Dept: URGENT CARE | Facility: CLINIC | Age: 22
End: 2022-07-21
Payer: COMMERCIAL

## 2022-07-21 VITALS — DIASTOLIC BLOOD PRESSURE: 59 MMHG | RESPIRATION RATE: 16 BRPM | SYSTOLIC BLOOD PRESSURE: 119 MMHG | HEART RATE: 75 BPM

## 2022-07-21 DIAGNOSIS — M54.9 UPPER BACK PAIN: Primary | ICD-10-CM

## 2022-07-21 PROCEDURE — 99213 OFFICE O/P EST LOW 20 MIN: CPT | Performed by: NURSE PRACTITIONER

## 2022-07-21 RX ORDER — MELOXICAM 15 MG/1
15 TABLET ORAL DAILY
Qty: 20 TABLET | Refills: 1 | Status: SHIPPED | OUTPATIENT
Start: 2022-07-21

## 2022-07-21 RX ORDER — METHOCARBAMOL 750 MG/1
TABLET, FILM COATED ORAL
Qty: 20 TABLET | Refills: 0 | Status: SHIPPED | OUTPATIENT
Start: 2022-07-21

## 2022-07-21 NOTE — PATIENT INSTRUCTIONS
--Avoid heavy lifting, frequent bending, other potentially exacerbating activities  --Moist heat (OTC Thermacare patches or hot water bottle)  --OTC muscle cream or pain patches (Salonpas, Tigerbalm, Lidoderm, Voltaren gel)  --Massage, stretching exercises per handout  --Anti-inflammatory pain medication and/or muscle relaxant as prescribed  Use caution when taking muscle relaxant due to potential for sedation  Avoid operating heavy machinery, taking with alcohol  --Follow-up with physical therapy  and spine/pain management for ongoing symptoms  --Go to ER if you experience any worsening back pain and/or numbness/weakness of legs, change in bowel or bladder function, numbness in groin

## 2022-07-21 NOTE — LETTER
July 21, 2022     Patient: Ihsan Dubon   YOB: 2000   Date of Visit: 7/21/2022       To Whom it May Concern:    Ihsan Dubon was seen in my clinic on 7/21/2022  No heavy lifting or activities involvement frequent bending or use of upper body x 2 weeks  If you have any questions or concerns, please don't hesitate to call           Sincerely,          BE FORKS CARENOW        CC: No Recipients

## 2022-07-21 NOTE — PROGRESS NOTES
[facility]        NAME: Marian Holt is a 22 y o  female  : 2000    MRN: [id number]  DATE: 2022  TIME: 11:33 AM    Assessment and Plan   Upper back pain [M54 9]  1  Upper back pain  methocarbamol (ROBAXIN) 750 mg tablet    meloxicam (Mobic) 15 mg tablet    Ambulatory referral to Pain Management    Ambulatory referral to Physical Therapy     --Suspect chronic musculo-ligamentous strain/spasm as most likely cause, with mild scoliosis a possible contributing factor  Address per below  Patient Instructions     --Avoid heavy lifting, frequent bending, other potentially exacerbating activities  Work note given  --Moist heat (OTC Thermacare patches or hot water bottle)  --OTC muscle cream or pain patches (Salonpas, Tigerbalm, Lidoderm, Voltaren gel)  --Massage, stretching exercises per handout  --Anti-inflammatory pain medication and/or muscle relaxant as prescribed  Use caution when taking muscle relaxant due to potential for sedation  Avoid operating heavy machinery, taking with alcohol  --Follow-up with physical therapy  and spine/pain management for ongoing symptoms  --Go to ER if you experience any worsening back pain and/or numbness/weakness of legs, change in bowel or bladder function, numbness in groin  Chief Complaint     Chief Complaint   Patient presents with    Back Pain     Pain in upper back with breathing for the past week  History of Present Illness       Here with complaints of chronic (1+ year) upper back pain which has gotten worse over the past week or so  Pain described as aching, sharp bilateral upper back, L>R side, as well as midline  Radiates to neck at times but not to lower back, down arms, or elsewhere  Increased with most upper back movements as well as deep breath  No associated rash, cough, dyspnea, calf pain  Rates pain 9/10 at present  Tylenol and Motrin not helping  No associated arm numbness/weakness       Has history of mild scoliosis with x-rays and MRI performed in the past 2 years  Has seen PT in the past, but does not currently see specialist     No known injuries, 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  Thinks this may be a factor  Review of Systems   Review of Systems   Constitutional: Negative for fever  Respiratory: Negative for shortness of breath  Musculoskeletal: Positive for back pain  Skin: Negative for rash  Neurological: Negative for weakness and numbness  Current Medications       Current Outpatient Medications:     meloxicam (Mobic) 15 mg tablet, Take 1 tablet (15 mg total) by mouth daily, Disp: 20 tablet, Rfl: 1    methocarbamol (ROBAXIN) 750 mg tablet, Take 1-2 tablets by mouth every 6 hours as needed for muscle pain/spasm, Disp: 20 tablet, Rfl: 0    loratadine (CLARITIN) 10 mg tablet, Take 10 mg by mouth daily (Patient not taking: Reported on 7/21/2022), Disp: , Rfl:     methylPREDNISolone 4 MG tablet therapy pack, Use as directed on package (Patient not taking: Reported on 7/21/2022), Disp: 1 each, Rfl: 0    Current Allergies     Allergies as of 07/21/2022 - Reviewed 07/21/2022   Allergen Reaction Noted    Other Headache 04/23/2021            The following portions of the patient's history were reviewed and updated as appropriate: allergies, current medications, past family history, past medical history, past social history, past surgical history and problem list      Past Medical History:   Diagnosis Date    Allergic        No past surgical history on file  Family History   Problem Relation Age of Onset    Hypertension Father     Hyperlipidemia Father     Hypertension Paternal Grandmother          Medications have been verified  Objective   /59 (Patient Position: Sitting)   Pulse 75   Resp 16   No LMP recorded         Physical Exam     Physical Exam  Constitutional:       General: She is not in acute distress  Cardiovascular:      Rate and Rhythm: Normal rate and regular rhythm  Pulmonary:      Effort: Pulmonary effort is normal  No respiratory distress  Breath sounds: Normal breath sounds  No stridor  No wheezing, rhonchi or rales  Comments: Breathing non-labored  RR=18  Chest:      Chest wall: No tenderness  Musculoskeletal:         General: Tenderness present  No swelling, deformity or signs of injury  Normal range of motion  Comments: Axial and bilateral PVM/interscapular upper thoracic tenderness with firm musculature, no other visualized or palpable abnormalities  No scoliosis noted with direct visualization or Pako's test     No cervical or lower back tenderness  Normal painless neck AROM  Normal trunk AROM with some pain at limits  Neurological:      Mental Status: She is alert and oriented to person, place, and time     Psychiatric:         Mood and Affect: Mood normal

## 2022-11-04 ENCOUNTER — APPOINTMENT (OUTPATIENT)
Dept: URGENT CARE | Facility: CLINIC | Age: 22
End: 2022-11-04

## 2022-11-04 DIAGNOSIS — Z00.00 PHYSICAL EXAM: ICD-10-CM

## 2022-11-04 PROCEDURE — 86480 TB TEST CELL IMMUN MEASURE: CPT | Performed by: FAMILY MEDICINE

## 2022-11-08 LAB
GAMMA INTERFERON BACKGROUND BLD IA-ACNC: 0.03 IU/ML
M TB IFN-G BLD-IMP: NEGATIVE
M TB IFN-G CD4+ BCKGRND COR BLD-ACNC: 0 IU/ML
M TB IFN-G CD4+ BCKGRND COR BLD-ACNC: 0 IU/ML
MITOGEN IGNF BCKGRD COR BLD-ACNC: 3.42 IU/ML

## 2024-06-18 ENCOUNTER — OFFICE VISIT (OUTPATIENT)
Dept: INTERNAL MEDICINE CLINIC | Facility: CLINIC | Age: 24
End: 2024-06-18
Payer: COMMERCIAL

## 2024-06-18 ENCOUNTER — APPOINTMENT (OUTPATIENT)
Dept: LAB | Facility: CLINIC | Age: 24
End: 2024-06-18
Payer: COMMERCIAL

## 2024-06-18 VITALS
WEIGHT: 154 LBS | SYSTOLIC BLOOD PRESSURE: 110 MMHG | OXYGEN SATURATION: 98 % | HEIGHT: 63 IN | RESPIRATION RATE: 14 BRPM | DIASTOLIC BLOOD PRESSURE: 78 MMHG | BODY MASS INDEX: 27.29 KG/M2 | TEMPERATURE: 97 F | HEART RATE: 72 BPM

## 2024-06-18 DIAGNOSIS — Z00.00 PHYSICAL EXAM: ICD-10-CM

## 2024-06-18 DIAGNOSIS — Z00.00 ANNUAL PHYSICAL EXAM: Primary | ICD-10-CM

## 2024-06-18 LAB
HBV SURFACE AB SER-ACNC: <3 MIU/ML
HCV AB SER QL: NORMAL

## 2024-06-18 PROCEDURE — 99395 PREV VISIT EST AGE 18-39: CPT

## 2024-06-18 PROCEDURE — 86803 HEPATITIS C AB TEST: CPT

## 2024-06-18 PROCEDURE — 86480 TB TEST CELL IMMUN MEASURE: CPT

## 2024-06-18 PROCEDURE — 86706 HEP B SURFACE ANTIBODY: CPT

## 2024-06-18 PROCEDURE — 36415 COLL VENOUS BLD VENIPUNCTURE: CPT

## 2024-06-18 NOTE — ASSESSMENT & PLAN NOTE
Patient presents today for annual physical in preparation for attendance of medical school work note and then coming to college.  Overall in good health  Will need an updated pediatric vaccination booster in order to complete immunization.  Recommended testing including QuantiFERON gold test, hep C antibody and hep B surface antibody sent in accordance with paperwork.Patient declined all other screening  Will return to clinic for eye exam with her glasses

## 2024-06-18 NOTE — PROGRESS NOTES
INTERNAL MEDICINE HEALTH MAINTENANCE OFFICE VISIT  [facility] - [facility] INTERNAL MEDICINE [place]    NAME: Olya Maldonado  AGE: 23 y.o. SEX: female  : 2000     DATE: 2024     Assessment and Plan:      Diagnosis ICD-10-CM Associated Orders   1. Annual physical exam  Z00.00       2. 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  Overall in good health  Will need an updated pediatric vaccination booster in order to complete immunization.  Recommended testing including QuantiFERON gold test, hep C antibody and hep B surface antibody sent in accordance with paperwork.  Vaccination history/ Immunization to be reconciled to include meningitis, Tdap, hepatitis B series.  Patient declined all other screening as part of an annual physical-CBC, lipid panel, HIV, STD testing,reports being uptodate with PAP screening. Last OBGY visit per 2020  Will return to clinic for eye exam with her glasses  Previously had reaction to flu vaccine-will need an exemption note as previously done.    Patient Counseling:  Nutrition: Stressed importance of moderation in sodium/caffeine intake, saturated fat, trans fat and cholesterol. Discussed portion control as well as increasing intake of fruits, vegetables, lean protein, and fish.   Exercise: Stressed the importance of regular exercise at least 150 mins/week  Sexuality: Discussed sexually transmitted diseases, partner selection, use of condoms, avoidance of unintended pregnancy  and contraceptive alternatives.  Injury prevention: Discussed safety belts, safety helmets, smoke detector, smoking near bedding or upholstery.   Dental health: Discussed importance of regular tooth brushing, flossing, and dental visits.  Immunizations reviewed.   Will need to get  pediatric vaccination record  Discussed benefits of screening patient denied.  Education was printed for patient    Discussed the patient's BMI with her.  The BMI   Depression Screening and Follow-up Plan: Patient was screened for depression during today's encounter. They screened negative with a PHQ-2 score of 0.        No follow-ups on file.     Chief Complaint:     Chief Complaint   Patient presents with    Physical Exam     Needs physical for Nursing school        History of Present Illness:     Well Adult Physical   Patient here for a comprehensive physical exam.The patient reports no problems    Diet and Physical Activity  Diet: well balanced diet  Weight concerns: Patient is overweight (BMI 25.0-29.9)  Exercise: frequently      Depression Screen  Negative for depression with PHQ2 score of 0.     General Health  Hearing: Normal:  bilateral  Vision: wears glasses  Dental: regular dental visits    Reproductive Health  Upto date     The following portions of the patient's history were reviewed and updated as appropriate: allergies, current medications, past family history, past medical history, past social history, past surgical history and problem list.     Review of Systems:     Review of Systems     Past Medical History:     Past Medical History:   Diagnosis Date    Allergic         Past Surgical History:     No past surgical history on file.     Social History:     Social History     Socioeconomic History    Marital status: Single     Spouse name: None    Number of children: None    Years of education: None    Highest education level: None   Occupational History    None   Tobacco Use    Smoking status: Never    Smokeless tobacco: Never   Vaping Use    Vaping status: Never Used   Substance and Sexual Activity    Alcohol use: Not Currently    Drug use: Not Currently    Sexual activity: Yes     Partners: Male     Birth control/protection: Condom Male   Other Topics Concern    None   Social History Narrative     "None     Social Determinants of Health     Financial Resource Strain: Not on file   Food Insecurity: Not on file   Transportation Needs: Not on file   Physical Activity: Not on file   Stress: Not on file   Social Connections: Not on file   Intimate Partner Violence: Not on file   Housing Stability: Not on file        Family History:     Family History   Problem Relation Age of Onset    Hypertension Father     Hyperlipidemia Father     Hypertension Paternal Grandmother         Current Medications:     Outpatient Medications Prior to Visit   Medication Sig Dispense Refill    loratadine (CLARITIN) 10 mg tablet Take 10 mg by mouth daily (Patient not taking: Reported on 7/21/2022)      meloxicam (Mobic) 15 mg tablet Take 1 tablet (15 mg total) by mouth daily (Patient not taking: Reported on 6/18/2024) 20 tablet 1    methocarbamol (ROBAXIN) 750 mg tablet Take 1-2 tablets by mouth every 6 hours as needed for muscle pain/spasm (Patient not taking: Reported on 6/18/2024) 20 tablet 0    methylPREDNISolone 4 MG tablet therapy pack Use as directed on package (Patient not taking: Reported on 7/21/2022) 1 each 0     No facility-administered medications prior to visit.         Allergies:     Allergies   Allergen Reactions    Other Headache     Seasonal allergies        Objective:     Physical Exam:  /78 (BP Location: Left arm, Patient Position: Sitting, Cuff Size: Adult)   Pulse 72   Temp (!) 97 °F (36.1 °C) (Tympanic)   Resp 14   Ht 5' 2.5\" (1.588 m)   Wt 69.9 kg (154 lb)   SpO2 98%   BMI 27.72 kg/m²     General Appearance:    Alert, cooperative, no distress, appears stated age   Head:    Normocephalic, without obvious abnormality, atraumatic   Eyes:    PERRL, conjunctiva/corneas clear, EOM's intact, fundi     benign, both eyes   Ears:    Normal TM's and external ear canals, both ears   Nose:   Nares normal, septum midline, mucosa normal, no drainage    or sinus tenderness   Throat:   Lips, mucosa, and tongue normal; " teeth and gums normal   Neck:   Supple, symmetrical, trachea midline, no adenopathy;     thyroid:  no enlargement/tenderness/nodules; no carotid    bruit or JVD   Back:     Symmetric, no curvature, ROM normal, no CVA tenderness   Lungs:     Clear to auscultation bilaterally, respirations unlabored   Chest Wall:    No tenderness or deformity    Heart:    Regular rate and rhythm, S1 and S2 normal, no murmur, rub   or gallop   Breast Exam:   deferred   Abdomen:     Soft, non-tender, bowel sounds active all four quadrants,     no masses, no organomegaly   Genitalia:    deferred   Rectal:    deferred   Extremities:   Extremities normal, atraumatic, no cyanosis or edema   Pulses:   2+ and symmetric all extremities   Skin:   Skin color, texture, turgor normal, no rashes or lesions   Lymph nodes:   Cervical, supraclavicular, and axillary nodes normal   Neurologic:   CNII-XII intact, normal strength, sensation and reflexes     throughout       Data:    Laboratory Results:  immunization titres, CBC, CMP 2021       Health Maintenance:     Health Maintenance   Topic Date Due    Hepatitis C Screening  Never done    HIV Screening  Never done    HPV Vaccine (1 - 3-dose series) Never done    Chlamydia Screening  12/21/2021    Annual Physical  03/10/2022    DTaP,Tdap,and Td Vaccines (1 - Tdap) Never done    Cervical Cancer Screening  Never done    COVID-19 Vaccine (3 - 2023-24 season) 09/01/2023    Influenza Vaccine (Season Ended) 09/01/2024    Depression Screening  06/18/2025    Zoster Vaccine (1 of 2) 10/04/2051    RSV Vaccine Age 60+ Years (1 - 1-dose 60+ series) 10/04/2061    RSV Vaccine age 0-20 Months  Aged Out    Pneumococcal Vaccine: Pediatrics (0 to 5 Years) and At-Risk Patients (6 to 64 Years)  Aged Out    HIB Vaccine  Aged Out    IPV Vaccine  Aged Out    Hepatitis A Vaccine  Aged Out    Meningococcal ACWY Vaccine  Aged Out     Immunization History   Administered Date(s) Administered    COVID-19 PFIZER VACCINE 0.3 ML IM  08/05/2021, 08/26/2021       Elizabeth Maciel MD  [facility] INTERNAL MEDICINE [place]

## 2024-06-19 ENCOUNTER — CLINICAL SUPPORT (OUTPATIENT)
Dept: INTERNAL MEDICINE CLINIC | Facility: CLINIC | Age: 24
End: 2024-06-19
Payer: COMMERCIAL

## 2024-06-19 DIAGNOSIS — Z23 ENCOUNTER FOR IMMUNIZATION: Primary | ICD-10-CM

## 2024-06-19 LAB
GAMMA INTERFERON BACKGROUND BLD IA-ACNC: <0 IU/ML
M TB IFN-G BLD-IMP: NEGATIVE
M TB IFN-G CD4+ BCKGRND COR BLD-ACNC: 0.02 IU/ML
M TB IFN-G CD4+ BCKGRND COR BLD-ACNC: 0.03 IU/ML
MITOGEN IGNF BCKGRD COR BLD-ACNC: 10 IU/ML

## 2024-06-19 PROCEDURE — 90471 IMMUNIZATION ADMIN: CPT | Performed by: INTERNAL MEDICINE

## 2024-06-19 PROCEDURE — 90746 HEPB VACCINE 3 DOSE ADULT IM: CPT | Performed by: INTERNAL MEDICINE

## 2024-06-19 PROCEDURE — 90715 TDAP VACCINE 7 YRS/> IM: CPT | Performed by: INTERNAL MEDICINE

## 2024-06-19 PROCEDURE — 90472 IMMUNIZATION ADMIN EACH ADD: CPT | Performed by: INTERNAL MEDICINE

## 2024-07-03 ENCOUNTER — TELEPHONE (OUTPATIENT)
Dept: INTERNAL MEDICINE CLINIC | Facility: CLINIC | Age: 24
End: 2024-07-03

## 2024-07-03 ENCOUNTER — TELEPHONE (OUTPATIENT)
Age: 24
End: 2024-07-03

## 2024-07-03 NOTE — TELEPHONE ENCOUNTER
I called patient to get old records to why she needs exemption for flu shot, she said she will drop them off on Friday , I am putting the forms in dr coello box to fill out. She needs these by Monday.

## 2024-07-03 NOTE — TELEPHONE ENCOUNTER
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     Patient is still waiting on those forms, and need them completed as soon as possible before the start of next week July 8th.     Please advise back to patient once the forms have been filled out, in order for patient to pick them up from the office.

## 2024-07-03 NOTE — LETTER
To whom it may concern:     Patient Olya Maldonado,  2000 is currently under my care. Patient had a severe reaction to Flu vaccine and she should not received any further Flu vaccine.          Kaycee Bautista MD    2024

## 2024-07-03 NOTE — TELEPHONE ENCOUNTER
Olya needs a one month  hep b shot on 7-19-24,need to set up nurse visit. I called and left patient message when she calls back please set up appointment on or around 7-19-24.

## 2024-07-03 NOTE — TELEPHONE ENCOUNTER
Pt made appt for 7/19 10am - returned call for   Olya needs a one month  hep b shot on 7-19-24,need to set up nurse visit. I called and left patient message when she calls back please set up appointment on or around 7-19-24.

## 2024-07-19 ENCOUNTER — CLINICAL SUPPORT (OUTPATIENT)
Dept: INTERNAL MEDICINE CLINIC | Facility: CLINIC | Age: 24
End: 2024-07-19
Payer: COMMERCIAL

## 2024-07-19 DIAGNOSIS — Z23 ENCOUNTER FOR IMMUNIZATION: Primary | ICD-10-CM

## 2024-07-19 PROCEDURE — G0010 ADMIN HEPATITIS B VACCINE: HCPCS

## 2024-07-19 PROCEDURE — 90746 HEPB VACCINE 3 DOSE ADULT IM: CPT

## 2024-08-14 ENCOUNTER — TELEPHONE (OUTPATIENT)
Age: 24
End: 2024-08-14

## 2024-08-14 NOTE — TELEPHONE ENCOUNTER
Pt scheduled follow up appt, requested flu shot. Pt did not have order in chart but did have dr letter to avoid shot. Please advise pt accordingly.

## 2025-06-02 ENCOUNTER — OFFICE VISIT (OUTPATIENT)
Dept: INTERNAL MEDICINE CLINIC | Facility: CLINIC | Age: 25
End: 2025-06-02
Payer: COMMERCIAL

## 2025-06-02 VITALS
HEIGHT: 63 IN | BODY MASS INDEX: 27.64 KG/M2 | TEMPERATURE: 97.6 F | SYSTOLIC BLOOD PRESSURE: 112 MMHG | DIASTOLIC BLOOD PRESSURE: 78 MMHG | RESPIRATION RATE: 16 BRPM | HEART RATE: 60 BPM | OXYGEN SATURATION: 98 % | WEIGHT: 156 LBS

## 2025-06-02 DIAGNOSIS — Z28.39 HEPATITIS B VACCINATION NOT UP TO DATE: ICD-10-CM

## 2025-06-02 DIAGNOSIS — Z00.00 ANNUAL PHYSICAL EXAM: ICD-10-CM

## 2025-06-02 DIAGNOSIS — Z23 ENCOUNTER FOR IMMUNIZATION: ICD-10-CM

## 2025-06-02 DIAGNOSIS — M54.12 RADICULITIS OF RIGHT CERVICAL REGION: Primary | ICD-10-CM

## 2025-06-02 PROCEDURE — 90746 HEPB VACCINE 3 DOSE ADULT IM: CPT

## 2025-06-02 PROCEDURE — 90471 IMMUNIZATION ADMIN: CPT

## 2025-06-02 PROCEDURE — 99214 OFFICE O/P EST MOD 30 MIN: CPT | Performed by: INTERNAL MEDICINE

## 2025-06-02 PROCEDURE — 99395 PREV VISIT EST AGE 18-39: CPT | Performed by: INTERNAL MEDICINE

## 2025-06-02 NOTE — PATIENT INSTRUCTIONS
"Patient Education     Routine physical for adults   The Basics   Written by the doctors and editors at [facility]   What is a physical? -- A physical is a routine visit, or \"check-up,\" with your doctor. You might also hear it called a \"wellness visit\" or \"preventive visit.\"  During each visit, the doctor will:   Ask about your physical and mental health   Ask about your habits, behaviors, and lifestyle   Do an exam   Give you vaccines if needed   Talk to you about any medicines you take   Give advice about your health   Answer your questions  Getting regular check-ups is an important part of taking care of your health. It can help your doctor find and treat any problems you have. But it's also important for preventing health problems.  A routine physical is different from a \"sick visit.\" A sick visit is when you see a doctor because of a health concern or problem. Since physicals are scheduled ahead of time, you can think about what you want to ask the doctor.  How often should I get a physical? -- It depends on your age and health. In general, for people age 21 years and older:   If you are younger than 50 years, you might be able to get a physical every 3 years.   If you are 50 years or older, your doctor might recommend a physical every year.  If you have an ongoing health condition, like diabetes or high blood pressure, your doctor will probably want to see you more often.  What happens during a physical? -- In general, each visit will include:   Physical exam - The doctor or nurse will check your height, weight, heart rate, and blood pressure. They will also look at your eyes and ears. They will ask about how you are feeling and whether you have any symptoms that bother you.   Medicines - It's a good idea to bring a list of all the medicines you take to each doctor visit. Your doctor will talk to you about your medicines and answer any questions. Tell them if you are having any side effects that bother you. You " "should also tell them if you are having trouble paying for any of your medicines.   Habits and behaviors - This includes:   Your diet   Your exercise habits   Whether you smoke, drink alcohol, or use drugs   Whether you are sexually active   Whether you feel safe at home  Your doctor will talk to you about things you can do to improve your health and lower your risk of health problems. They will also offer help and support. For example, if you want to quit smoking, they can give you advice and might prescribe medicines. If you want to improve your diet or get more physical activity, they can help you with this, too.   Lab tests, if needed - The tests you get will depend on your age and situation. For example, your doctor might want to check your:   Cholesterol   Blood sugar   Iron level   Vaccines - The recommended vaccines will depend on your age, health, and what vaccines you already had. Vaccines are very important because they can prevent certain serious or deadly infections.   Discussion of screening - \"Screening\" means checking for diseases or other health problems before they cause symptoms. Your doctor can recommend screening based on your age, risk, and preferences. This might include tests to check for:   Cancer, such as breast, prostate, cervical, ovarian, colorectal, prostate, lung, or skin cancer   Sexually transmitted infections, such as chlamydia and gonorrhea   Mental health conditions like depression and anxiety  Your doctor will talk to you about the different types of screening tests. They can help you decide which screenings to have. They can also explain what the results might mean.   Answering questions - The physical is a good time to ask the doctor or nurse questions about your health. If needed, they can refer you to other doctors or specialists, too.  Adults older than 65 years often need other care, too. As you get older, your doctor will talk to you about:   How to prevent falling at " home   Hearing or vision tests   Memory testing   How to take your medicines safely   Making sure that you have the help and support you need at home  All topics are updated as new evidence becomes available and our peer review process is complete.  This topic retrieved from [employer] on: May 02, 2024.  Topic 293868 Version 1.0  Release: 32.4.3 - C32.122  © 2024 UpToDate, Inc. and/or its affiliates. All rights reserved.  Consumer Information Use and Disclaimer   Disclaimer: This generalized information is a limited summary of diagnosis, treatment, and/or medication information. It is not meant to be comprehensive and should be used as a tool to help the user understand and/or assess potential diagnostic and treatment options. It does NOT include all information about conditions, treatments, medications, side effects, or risks that may apply to a specific patient. It is not intended to be medical advice or a substitute for the medical advice, diagnosis, or treatment of a health care provider based on the health care provider's examination and assessment of a patient's specific and unique circumstances. Patients must speak with a health care provider for complete information about their health, medical questions, and treatment options, including any risks or benefits regarding use of medications. This information does not endorse any treatments or medications as safe, effective, or approved for treating a specific patient. UpToDate, Inc. and its affiliates disclaim any warranty or liability relating to this information or the use thereof.The use of this information is governed by the Terms of Use, available at https://www.woltersSuperpedestrianuwer.com/en/know/clinical-effectiveness-terms. 2024© UpToDate, Inc. and its affiliates and/or licensors. All rights reserved.  Copyright   © 2024 UpToDate, Inc. and/or its affiliates. All rights reserved.

## 2025-06-02 NOTE — PROGRESS NOTES
Adult Annual Physical  Name: Olya Maldonado      : 2000      MRN: [id number]  Encounter Provider: Kaycee Bautista MD  Encounter Date: 2025   Encounter department: [facility] INTERNAL MEDICINE [place]    :  Assessment & Plan  Radiculitis of right cervical region  She continues to have pain over shoulder and neck, she would like to continue with chiropractor since it was helping her    Orders:  •  Ambulatory Referral to Chiropractic; Future    Annual physical exam  Here for updated physical for school, vaccinations updated       Hepatitis B vaccination not up to date  She missed the last dose of hepatitis B will give today, recommended to get surface antibody in 2 to 3 weeks.  Orders:  •  Hepatitis B surface antibody; Future    Encounter for immunization    Orders:  •  HEPATITIS B VACCINE ADULT IM        Preventive Screenings:  - Diabetes Screening: screening up-to-date  - Cholesterol Screening: screening up-to-date   - Hepatitis C screening: screening up-to-date   - Cervical cancer screening: orders placed   - Lung cancer screening: screening not indicated     Immunizations:  - Immunizations due: HPV (Gardasil 9)         History of Present Illness     Adult Annual Physical:  Patient presents for annual physical.     Diet and Physical Activity:  - Diet/Nutrition: no special diet.  - Exercise: moderate cardiovascular exercise.    Depression Screening:  - PHQ-2 Score: 0    General Health:  - Sleep: sleeps well.  - Hearing: normal hearing right ear and normal hearing left ear.  - Vision: no vision problems.  - Dental: regular dental visits.    Review of Systems   Constitutional:  Negative for appetite change and fatigue.   Eyes:  Negative for visual disturbance.   Respiratory:  Negative for cough, chest tightness, shortness of breath and wheezing.    Cardiovascular:  Negative for chest pain, palpitations and leg swelling.   Gastrointestinal:  Negative for abdominal pain, nausea and vomiting.  "  Genitourinary:  Negative for difficulty urinating and frequency.   Musculoskeletal:  Positive for neck pain. Negative for arthralgias and joint swelling.   Skin:  Negative for rash.   Neurological:  Negative for dizziness and headaches.   Psychiatric/Behavioral:  Negative for confusion and sleep disturbance.        Objective   /78 (BP Location: Left arm, Patient Position: Sitting, Cuff Size: Adult)   Pulse 60   Temp 97.6 °F (36.4 °C) (Tympanic)   Resp 16   Ht 5' 2.5\" (1.588 m)   Wt 70.8 kg (156 lb)   SpO2 98%   BMI 28.08 kg/m²     Physical Exam  Vitals and nursing note reviewed.   Constitutional:       General: She is not in acute distress.     Appearance: She is well-developed.   HENT:      Head: Normocephalic and atraumatic.      Right Ear: Tympanic membrane normal.      Left Ear: Tympanic membrane normal.      Nose: Nose normal.      Mouth/Throat:      Mouth: Mucous membranes are moist.      Pharynx: Oropharynx is clear.     Eyes:      Conjunctiva/sclera: Conjunctivae normal.       Cardiovascular:      Rate and Rhythm: Normal rate and regular rhythm.      Heart sounds: No murmur heard.  Pulmonary:      Effort: Pulmonary effort is normal. No respiratory distress.      Breath sounds: Normal breath sounds.   Abdominal:      Palpations: Abdomen is soft.      Tenderness: There is no abdominal tenderness.     Musculoskeletal:         General: No swelling. Normal range of motion.      Cervical back: Neck supple.     Skin:     General: Skin is warm and dry.      Capillary Refill: Capillary refill takes less than 2 seconds.     Neurological:      Mental Status: She is alert.     Psychiatric:         Mood and Affect: Mood normal.     "

## 2025-06-02 NOTE — ASSESSMENT & PLAN NOTE
She continues to have pain over shoulder and neck, she would like to continue with chiropractor since it was helping her    Orders:    Ambulatory Referral to Chiropractic; Future

## 2025-06-17 ENCOUNTER — OFFICE VISIT (OUTPATIENT)
Age: 25
End: 2025-06-17
Payer: COMMERCIAL

## 2025-06-17 VITALS
SYSTOLIC BLOOD PRESSURE: 116 MMHG | WEIGHT: 156 LBS | HEART RATE: 76 BPM | DIASTOLIC BLOOD PRESSURE: 79 MMHG | BODY MASS INDEX: 27.64 KG/M2 | HEIGHT: 63 IN

## 2025-06-17 DIAGNOSIS — M54.12 RADICULITIS OF RIGHT CERVICAL REGION: ICD-10-CM

## 2025-06-17 DIAGNOSIS — M54.2 NECK PAIN: Primary | ICD-10-CM

## 2025-06-17 DIAGNOSIS — M99.01 SEGMENTAL DYSFUNCTION OF CERVICAL REGION: ICD-10-CM

## 2025-06-17 DIAGNOSIS — M99.02 SEGMENTAL DYSFUNCTION OF THORACIC REGION: ICD-10-CM

## 2025-06-17 DIAGNOSIS — M99.03 SEGMENTAL DYSFUNCTION OF LUMBAR REGION: ICD-10-CM

## 2025-06-17 DIAGNOSIS — G44.86 CERVICOGENIC HEADACHE: ICD-10-CM

## 2025-06-17 PROCEDURE — 97110 THERAPEUTIC EXERCISES: CPT | Performed by: CHIROPRACTOR

## 2025-06-17 PROCEDURE — 98941 CHIROPRACT MANJ 3-4 REGIONS: CPT | Performed by: CHIROPRACTOR

## 2025-06-17 PROCEDURE — 99243 OFF/OP CNSLTJ NEW/EST LOW 30: CPT | Performed by: CHIROPRACTOR

## 2025-06-17 NOTE — PROGRESS NOTES
Diagnoses and all orders for this visit:    Neck pain    Cervicogenic headache    Radiculitis of right cervical region  -     Ambulatory Referral to Chiropractic    Segmental dysfunction of cervical region    Segmental dysfunction of thoracic region    Segmental dysfunction of lumbar region      ASSESSMENT:  No red flags, radiculopathy or neurologic deficit appreciated clinically. Pt's symptoms and exam findings consistent with mechanical neck/ubp secondary to repetitive st/sp injury, exacerbated by postural/ergonomic stressors. No radiculopathy reproduced at time to initial exam but symptoms will be monitored. Pt responded well to stretches and manual mobilization of the affected spinal and myofascial tissues with increased ROM; trial of conservative tx recommended consisting of stretching, ther-ex, graded mobilization/manipulation of the affected spinal/myofascial tissues, postural/ergonomic education and take home stretches/exercises. If symptoms fail to improve with short trial of conservative care, appropriate imaging and referral will be coordinated.  Spent greater than 30 min c pt discussing hx, pe, ddx, tx options and reviewing notes/imaging    PROCEDURE CODES: 91391 and 41683, 32937    TREATMENT:  Fear avoidance behavior discussion, encouraged and reassured pt that natural course of condition is to improve over time with adherence to tx plan and home care strategies. Home care recommendations: avoid bed rest, walk (but avoid trails and uneven surfaces), gradual return to activity to tolerance (avoid anything that peripheralizes symptoms), ice 20 min on/off, watch for ice burn, call if symptoms peripheralize, worsen, or neurologic deficit progresses. Ther-ex: IASTM - discussed post procedure soreness and/or ecchymosis for up to 36 hrs, applied to affected mm hypertonicities; wall maria, axial retraction, upper trap stretch, lev scap stretch, SCM stretch, lat rows with t-band, lat pull down with t-band,  abdominal bracing; greater than 15 min spent performing above mentioned ther-ex. Thoracic mobilization/manipulation: prone P-A mob; cervical mobilization/manipulation: cervical traction, prone C/T jct HVLA, Lumbar PA- HVLA, Flexion distraction    HPI:  Olya Maldonado is a 24 y.o. female   Chief Complaint   Patient presents with   • Neck - Pain     Neck pain that radiates down both  shoulders, both arms down to both fingers with sharp, shooting and tingling   Pain score 8      • Back Pain     Mid back is sharp, shooting and tingling pain. Pain score 8        The patient presents to the office with neck and upper back pain that has been present for about 2 years without trauma. The patient mentions she went to chiropractic in the past with good results but had to stop in early 2024 due to location and time constraints. 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 Recently went to PCP Taqueria Bautista MD and referred here for consult.  Pain level is moderate 5-6/10 but goes up to a 8-10/10. The patient runs and lift weights a few times a week. HA have become more frequent recently- 2 times a week on the side of the head 5-6/10. No recent symptoms into the arms but hx of tingling into fingers.     Back Pain  Pertinent negatives include no chest pain, fever, headaches, numbness or weakness.     Past Medical History[1]   The following portions of the patient's history were reviewed and updated as appropriate: allergies, past family history, past medical history, past social history, past surgical history, and problem list.  Review of Systems   Constitutional:  Negative for activity change, fatigue, fever and unexpected weight change.   HENT:  Negative for ear pain, hearing loss, sinus pressure, sinus pain, sore throat and tinnitus.    Respiratory:  Negative for chest tightness, shortness of breath, wheezing and stridor.    Cardiovascular:  Negative for chest  pain.   Genitourinary:  Negative for flank pain and frequency.   Musculoskeletal:  Positive for back pain, myalgias, neck pain and neck stiffness. Negative for joint swelling.   Skin:  Negative for color change and pallor.   Neurological:  Negative for dizziness, speech difficulty, weakness, numbness and headaches.   Psychiatric/Behavioral:  Negative for agitation and sleep disturbance. The patient is not nervous/anxious.      Physical Exam  Constitutional:       General: She is not in acute distress.     Appearance: Normal appearance.   HENT:      Head: Normocephalic.      Mouth/Throat:      Mouth: Mucous membranes are moist.     Eyes:      Extraocular Movements: Extraocular movements intact.      Conjunctiva/sclera: Conjunctivae normal.      Pupils: Pupils are equal, round, and reactive to light.     Neck:      Vascular: No carotid bruit.   Pulmonary:      Effort: Pulmonary effort is normal.   Chest:      Chest wall: No tenderness.   Abdominal:      General: Abdomen is flat.      Palpations: Abdomen is soft.     Musculoskeletal:         General: Tenderness present. No swelling, deformity or signs of injury. Normal range of motion.        Arms:       Cervical back: Normal range of motion. Rigidity and tenderness present.      Right lower leg: No edema.      Left lower leg: No edema.   Lymphadenopathy:      Cervical: No cervical adenopathy.     Skin:     General: Skin is warm.      Coloration: Skin is not jaundiced or pale.      Findings: No bruising or erythema.     Neurological:      Mental Status: She is alert and oriented to person, place, and time.      Cranial Nerves: No cranial nerve deficit.      Sensory: No sensory deficit.      Motor: No weakness.      Gait: Gait is intact.      Deep Tendon Reflexes: Reflexes are normal and symmetric.     Psychiatric:         Attention and Perception: Attention normal.         Mood and Affect: Mood and affect normal.         Speech: Speech normal.         Behavior:  Behavior normal. Behavior is cooperative.         Thought Content: Thought content normal.         Cognition and Memory: Cognition normal.         Judgment: Judgment normal.       SOFT TISSUE ASSESSMENT: Hypertonicity and tenderness palpated C3-T6 L1-3 erector spinae, upper/middle traps, lev scap, SCM, scalene, rhomboid, suboccipitals JOINT RECTRICTIONS: C5-T6 T10-L2, ORTHO: Nakia unremarkable for centralization/peripheralization; max foraminal comp elicits local np R/L; shoulder depression elicits stiffness in R/L upper trap; brachial plexus tension test elicits neural tension in R/L UE; cervical distraction elicits relieves CC, SLUMP- neg, Sitting root - Neg, Iliac compression- Neg    Return in about 1 week (around 6/24/2025) for Recheck.           [1]  Past Medical History:  Diagnosis Date   • Allergic

## 2025-06-26 ENCOUNTER — PROCEDURE VISIT (OUTPATIENT)
Age: 25
End: 2025-06-26
Payer: COMMERCIAL

## 2025-06-26 VITALS
DIASTOLIC BLOOD PRESSURE: 85 MMHG | HEIGHT: 63 IN | SYSTOLIC BLOOD PRESSURE: 123 MMHG | HEART RATE: 87 BPM | BODY MASS INDEX: 27.64 KG/M2 | WEIGHT: 156 LBS

## 2025-06-26 DIAGNOSIS — G44.86 CERVICOGENIC HEADACHE: Primary | ICD-10-CM

## 2025-06-26 DIAGNOSIS — M99.01 SEGMENTAL DYSFUNCTION OF CERVICAL REGION: ICD-10-CM

## 2025-06-26 DIAGNOSIS — M99.03 SEGMENTAL DYSFUNCTION OF LUMBAR REGION: ICD-10-CM

## 2025-06-26 DIAGNOSIS — M99.02 SEGMENTAL DYSFUNCTION OF THORACIC REGION: ICD-10-CM

## 2025-06-26 DIAGNOSIS — M54.2 NECK PAIN: ICD-10-CM

## 2025-06-26 DIAGNOSIS — M54.12 RADICULITIS OF RIGHT CERVICAL REGION: ICD-10-CM

## 2025-06-26 PROCEDURE — 97110 THERAPEUTIC EXERCISES: CPT | Performed by: CHIROPRACTOR

## 2025-06-26 PROCEDURE — 98941 CHIROPRACT MANJ 3-4 REGIONS: CPT | Performed by: CHIROPRACTOR

## 2025-06-26 NOTE — PROGRESS NOTES
Diagnoses and all orders for this visit:    Cervicogenic headache    Radiculitis of right cervical region    Segmental dysfunction of cervical region    Segmental dysfunction of thoracic region    Segmental dysfunction of lumbar region    Neck pain      ASSESSMENT:  No red flags, radiculopathy or neurologic deficit appreciated clinically. Pt's symptoms and exam findings consistent with mechanical neck/ubp secondary to repetitive st/sp injury, exacerbated by postural/ergonomic stressors. No radiculopathy reproduced at time to initial exam but symptoms will be monitored. Pt responded well to stretches and manual mobilization of the affected spinal and myofascial tissues with increased ROM; trial of conservative tx recommended consisting of stretching, ther-ex, graded mobilization/manipulation of the affected spinal/myofascial tissues, postural/ergonomic education and take home stretches/exercises. If symptoms fail to improve with short trial of conservative care, appropriate imaging and referral will be coordinated.  6/26/25- The patient reports improvements post-trx    PROCEDURE CODES: 01576 and 94591    TREATMENT:  Fear avoidance behavior discussion, encouraged and reassured pt that natural course of condition is to improve over time with adherence to tx plan and home care strategies. Home care recommendations: avoid bed rest, walk (but avoid trails and uneven surfaces), gradual return to activity to tolerance (avoid anything that peripheralizes symptoms), ice 20 min on/off, watch for ice burn, call if symptoms peripheralize, worsen, or neurologic deficit progresses. Ther-ex: IASTM - discussed post procedure soreness and/or ecchymosis for up to 36 hrs, applied to affected mm hypertonicities; wall maria, axial retraction, upper trap stretch, lev scap stretch, SCM stretch, lat rows with t-band, lat pull down with t-band, abdominal bracing; greater than 15 min spent performing above mentioned ther-ex. Thoracic  mobilization/manipulation: prone P-A mob; cervical mobilization/manipulation: cervical traction, prone C/T jct HVLA, Lumbar PA- HVLA, Flexion distraction    HPI:  Olya Maldonado is a 24 y.o. female   Chief Complaint   Patient presents with   • Neck - Follow-up     Neck pain that radiates down both shoulders, arms and fingers  Pain score 6  Patient states slightly  better   • Back Pain     Lower lumbar pain score 6         The patient presents to the office with neck and upper back pain that has been present for about 2 years without trauma. The patient mentions she went to chiropractic in the past with good results but had to stop in early 2024 due to location and time constraints. 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 Recently went to PCP Taqueria Bautista MD and referred here for consult.  Pain level is moderate 5-6/10 but goes up to a 8-10/10. The patient runs and lift weights a few times a week. HA have become more frequent recently- 2 times a week on the side of the head 5-6/10. No recent symptoms into the arms but hx of tingling into fingers.   6/26/25- The patient has improvements in symptoms after last visit for a few days but today its backup to a 6/10.    Back Pain  Pertinent negatives include no chest pain, fever, headaches, numbness or weakness.     Past Medical History[1]   The following portions of the patient's history were reviewed and updated as appropriate: allergies, past family history, past medical history, past social history, past surgical history, and problem list.  Review of Systems   Constitutional:  Negative for activity change, fatigue, fever and unexpected weight change.   HENT:  Negative for ear pain, hearing loss, sinus pressure, sinus pain, sore throat and tinnitus.    Respiratory:  Negative for chest tightness, shortness of breath, wheezing and stridor.    Cardiovascular:  Negative for chest pain.   Genitourinary:   Negative for flank pain and frequency.   Musculoskeletal:  Positive for back pain, myalgias, neck pain and neck stiffness. Negative for joint swelling.   Skin:  Negative for color change and pallor.   Neurological:  Negative for dizziness, speech difficulty, weakness, numbness and headaches.   Psychiatric/Behavioral:  Negative for agitation and sleep disturbance. The patient is not nervous/anxious.      Physical Exam  Constitutional:       General: She is not in acute distress.     Appearance: Normal appearance.   HENT:      Head: Normocephalic.      Mouth/Throat:      Mouth: Mucous membranes are moist.     Eyes:      Extraocular Movements: Extraocular movements intact.      Conjunctiva/sclera: Conjunctivae normal.      Pupils: Pupils are equal, round, and reactive to light.     Neck:      Vascular: No carotid bruit.   Pulmonary:      Effort: Pulmonary effort is normal.   Chest:      Chest wall: No tenderness.   Abdominal:      General: Abdomen is flat.      Palpations: Abdomen is soft.     Musculoskeletal:         General: Tenderness present. No swelling, deformity or signs of injury. Normal range of motion.        Arms:       Cervical back: Normal range of motion. Rigidity and tenderness present.      Right lower leg: No edema.      Left lower leg: No edema.   Lymphadenopathy:      Cervical: No cervical adenopathy.     Skin:     General: Skin is warm.      Coloration: Skin is not jaundiced or pale.      Findings: No bruising or erythema.     Neurological:      Mental Status: She is alert and oriented to person, place, and time.      Cranial Nerves: No cranial nerve deficit.      Sensory: No sensory deficit.      Motor: No weakness.      Gait: Gait is intact.      Deep Tendon Reflexes: Reflexes are normal and symmetric.     Psychiatric:         Attention and Perception: Attention normal.         Mood and Affect: Mood and affect normal.         Speech: Speech normal.         Behavior: Behavior normal. Behavior is  cooperative.         Thought Content: Thought content normal.         Cognition and Memory: Cognition normal.         Judgment: Judgment normal.       SOFT TISSUE ASSESSMENT: Hypertonicity and tenderness palpated C3-T6 L1-3 erector spinae, upper/middle traps, lev scap, SCM, scalene, rhomboid, suboccipitals JOINT RECTRICTIONS: C5-T6 T10-L2, ORTHO: Nakia unremarkable for centralization/peripheralization; max foraminal comp elicits local np R/L; shoulder depression elicits stiffness in R/L upper trap; brachial plexus tension test elicits neural tension in R/L UE; cervical distraction elicits relieves CC, SLUMP- neg, Sitting root - Neg, Iliac compression- Neg    Return in about 1 week (around 7/3/2025) for Recheck.           [1]  Past Medical History:  Diagnosis Date   • Allergic

## 2025-07-09 ENCOUNTER — TELEPHONE (OUTPATIENT)
Age: 25
End: 2025-07-09

## 2025-07-09 NOTE — TELEPHONE ENCOUNTER
Caller: Patient     Doctor: Dr. BENJAMIN Mulligan     Reason for call: Called to rs cancelled appt from 7/1. Advised pt that 1st available at [facility] would be 7/31. Offered this Friday at [facility] and she stated it was too far. Offered to schedule for 7/31 and add the appt to the wait list but she declined. No appt scheduled at this time    Call back#: [phone]

## 2025-07-28 ENCOUNTER — TELEPHONE (OUTPATIENT)
Age: 25
End: 2025-07-28

## 2025-07-28 DIAGNOSIS — Z01.84 IMMUNITY STATUS TESTING: Primary | ICD-10-CM

## 2025-07-30 ENCOUNTER — APPOINTMENT (OUTPATIENT)
Dept: LAB | Facility: CLINIC | Age: 25
End: 2025-07-30
Attending: INTERNAL MEDICINE
Payer: COMMERCIAL

## 2025-07-30 DIAGNOSIS — Z01.84 IMMUNITY STATUS TESTING: ICD-10-CM

## 2025-07-30 DIAGNOSIS — Z28.39 HEPATITIS B VACCINATION NOT UP TO DATE: ICD-10-CM

## 2025-07-30 PROCEDURE — 86735 MUMPS ANTIBODY: CPT

## 2025-07-30 PROCEDURE — 86706 HEP B SURFACE ANTIBODY: CPT

## 2025-07-30 PROCEDURE — 86765 RUBEOLA ANTIBODY: CPT

## 2025-07-30 PROCEDURE — 36415 COLL VENOUS BLD VENIPUNCTURE: CPT

## 2025-07-30 PROCEDURE — 86762 RUBELLA ANTIBODY: CPT

## 2025-07-31 LAB
HBV SURFACE AB SER-ACNC: >500 MIU/ML
MEV IGG SER IA-ACNC: >300 AU/ML
MUV IGG SER IA-ACNC: >300 AU/ML
RUBV IGG SERPL IA-ACNC: 1.32 INDEX

## 2025-08-01 DIAGNOSIS — Z01.84 IMMUNITY STATUS TESTING: ICD-10-CM

## 2025-08-01 DIAGNOSIS — Z11.1 SCREENING-PULMONARY TB: Primary | ICD-10-CM

## 2025-08-02 ENCOUNTER — APPOINTMENT (OUTPATIENT)
Dept: LAB | Facility: CLINIC | Age: 25
End: 2025-08-02
Payer: COMMERCIAL

## 2025-08-02 DIAGNOSIS — Z11.1 SCREENING-PULMONARY TB: ICD-10-CM

## 2025-08-02 DIAGNOSIS — Z01.84 IMMUNITY STATUS TESTING: ICD-10-CM

## 2025-08-02 PROCEDURE — 36415 COLL VENOUS BLD VENIPUNCTURE: CPT

## 2025-08-02 PROCEDURE — 86787 VARICELLA-ZOSTER ANTIBODY: CPT

## 2025-08-02 PROCEDURE — 86480 TB TEST CELL IMMUN MEASURE: CPT

## 2025-08-03 LAB
GAMMA INTERFERON BACKGROUND BLD IA-ACNC: 0.02 IU/ML
M TB IFN-G BLD-IMP: NEGATIVE
M TB IFN-G CD4+ BCKGRND COR BLD-ACNC: 0 IU/ML
M TB IFN-G CD4+ BCKGRND COR BLD-ACNC: 0 IU/ML
MITOGEN IGNF BCKGRD COR BLD-ACNC: 5.05 IU/ML

## 2025-08-04 LAB — VZV IGM SER IA-ACNC: 1.38 INDEX (ref 0–0.9)

## 2025-08-07 ENCOUNTER — PROCEDURE VISIT (OUTPATIENT)
Age: 25
End: 2025-08-07
Payer: COMMERCIAL

## 2025-08-07 VITALS
HEIGHT: 63 IN | DIASTOLIC BLOOD PRESSURE: 73 MMHG | HEART RATE: 76 BPM | WEIGHT: 156 LBS | SYSTOLIC BLOOD PRESSURE: 121 MMHG | BODY MASS INDEX: 27.64 KG/M2

## 2025-08-07 DIAGNOSIS — M99.01 SEGMENTAL DYSFUNCTION OF CERVICAL REGION: ICD-10-CM

## 2025-08-07 DIAGNOSIS — M99.02 SEGMENTAL DYSFUNCTION OF THORACIC REGION: ICD-10-CM

## 2025-08-07 DIAGNOSIS — M99.03 SEGMENTAL DYSFUNCTION OF LUMBAR REGION: ICD-10-CM

## 2025-08-07 DIAGNOSIS — M54.2 NECK PAIN: ICD-10-CM

## 2025-08-07 DIAGNOSIS — G44.86 CERVICOGENIC HEADACHE: Primary | ICD-10-CM

## 2025-08-07 DIAGNOSIS — M54.12 RADICULITIS OF RIGHT CERVICAL REGION: ICD-10-CM

## 2025-08-07 PROCEDURE — 97110 THERAPEUTIC EXERCISES: CPT | Performed by: CHIROPRACTOR

## 2025-08-07 PROCEDURE — 98941 CHIROPRACT MANJ 3-4 REGIONS: CPT | Performed by: CHIROPRACTOR
